# Patient Record
Sex: FEMALE | Race: WHITE | NOT HISPANIC OR LATINO | Employment: FULL TIME | URBAN - METROPOLITAN AREA
[De-identification: names, ages, dates, MRNs, and addresses within clinical notes are randomized per-mention and may not be internally consistent; named-entity substitution may affect disease eponyms.]

---

## 2017-02-07 ENCOUNTER — TRANSCRIBE ORDERS (OUTPATIENT)
Dept: LAB | Facility: HOSPITAL | Age: 42
End: 2017-02-07

## 2017-02-07 ENCOUNTER — LAB (OUTPATIENT)
Dept: LAB | Facility: HOSPITAL | Age: 42
End: 2017-02-07

## 2017-02-07 DIAGNOSIS — Z79.899 POLYPHARMACY: ICD-10-CM

## 2017-02-07 DIAGNOSIS — IMO0001 REASON FOR CONSULTATION: ICD-10-CM

## 2017-02-07 DIAGNOSIS — Z51.81 ENCOUNTER FOR THERAPEUTIC DRUG MONITORING: ICD-10-CM

## 2017-02-07 DIAGNOSIS — L40.50 PSORIATIC ARTHROPATHY (HCC): Primary | ICD-10-CM

## 2017-02-07 DIAGNOSIS — L40.50 PSORIATIC ARTHROPATHY (HCC): ICD-10-CM

## 2017-02-07 LAB
ALBUMIN SERPL-MCNC: 4.2 G/DL (ref 3.2–4.8)
ALBUMIN/GLOB SERPL: 1.6 G/DL (ref 1.5–2.5)
ALP SERPL-CCNC: 111 U/L (ref 25–100)
ALT SERPL W P-5'-P-CCNC: 23 U/L (ref 7–40)
ANION GAP SERPL CALCULATED.3IONS-SCNC: 10 MMOL/L (ref 3–11)
AST SERPL-CCNC: 24 U/L (ref 0–33)
BASOPHILS # BLD AUTO: 0.04 10*3/MM3 (ref 0–0.2)
BASOPHILS NFR BLD AUTO: 0.8 % (ref 0–1)
BILIRUB SERPL-MCNC: 0.5 MG/DL (ref 0.3–1.2)
BUN BLD-MCNC: 6 MG/DL (ref 9–23)
BUN/CREAT SERPL: 8.6 (ref 7–25)
CALCIUM SPEC-SCNC: 9.4 MG/DL (ref 8.7–10.4)
CHLORIDE SERPL-SCNC: 107 MMOL/L (ref 99–109)
CO2 SERPL-SCNC: 23 MMOL/L (ref 20–31)
CREAT BLD-MCNC: 0.7 MG/DL (ref 0.6–1.3)
CRP SERPL-MCNC: 3.4 MG/DL (ref 0–10)
DEPRECATED RDW RBC AUTO: 48.1 FL (ref 37–54)
EOSINOPHIL # BLD AUTO: 0.2 10*3/MM3 (ref 0.1–0.3)
EOSINOPHIL NFR BLD AUTO: 3.8 % (ref 0–3)
ERYTHROCYTE [DISTWIDTH] IN BLOOD BY AUTOMATED COUNT: 14.8 % (ref 11.3–14.5)
GFR SERPL CREATININE-BSD FRML MDRD: 92 ML/MIN/1.73
GLOBULIN UR ELPH-MCNC: 2.7 GM/DL
GLUCOSE BLD-MCNC: 90 MG/DL (ref 70–100)
HCT VFR BLD AUTO: 42.6 % (ref 34.5–44)
HGB BLD-MCNC: 13.9 G/DL (ref 11.5–15.5)
IMM GRANULOCYTES # BLD: 0.01 10*3/MM3 (ref 0–0.03)
IMM GRANULOCYTES NFR BLD: 0.2 % (ref 0–0.6)
LYMPHOCYTES # BLD AUTO: 2.1 10*3/MM3 (ref 0.6–4.8)
LYMPHOCYTES NFR BLD AUTO: 39.9 % (ref 24–44)
MCH RBC QN AUTO: 29 PG (ref 27–31)
MCHC RBC AUTO-ENTMCNC: 32.6 G/DL (ref 32–36)
MCV RBC AUTO: 88.9 FL (ref 80–99)
MONOCYTES # BLD AUTO: 0.48 10*3/MM3 (ref 0–1)
MONOCYTES NFR BLD AUTO: 9.1 % (ref 0–12)
NEUTROPHILS # BLD AUTO: 2.43 10*3/MM3 (ref 1.5–8.3)
NEUTROPHILS NFR BLD AUTO: 46.2 % (ref 41–71)
PLATELET # BLD AUTO: 326 10*3/MM3 (ref 150–450)
PMV BLD AUTO: 11.3 FL (ref 6–12)
POTASSIUM BLD-SCNC: 4.3 MMOL/L (ref 3.5–5.5)
PROT SERPL-MCNC: 6.9 G/DL (ref 5.7–8.2)
RBC # BLD AUTO: 4.79 10*6/MM3 (ref 3.89–5.14)
SODIUM BLD-SCNC: 140 MMOL/L (ref 132–146)
WBC NRBC COR # BLD: 5.26 10*3/MM3 (ref 3.5–10.8)

## 2017-02-07 PROCEDURE — 85025 COMPLETE CBC W/AUTO DIFF WBC: CPT | Performed by: INTERNAL MEDICINE

## 2017-02-07 PROCEDURE — 80053 COMPREHEN METABOLIC PANEL: CPT | Performed by: INTERNAL MEDICINE

## 2017-02-07 PROCEDURE — 36415 COLL VENOUS BLD VENIPUNCTURE: CPT

## 2017-02-07 PROCEDURE — 86140 C-REACTIVE PROTEIN: CPT | Performed by: INTERNAL MEDICINE

## 2020-08-21 ENCOUNTER — TRANSCRIBE ORDERS (OUTPATIENT)
Dept: ADMINISTRATIVE | Facility: HOSPITAL | Age: 45
End: 2020-08-21

## 2020-08-21 DIAGNOSIS — M51.26 HERNIATED LUMBAR INTERVERTEBRAL DISC: ICD-10-CM

## 2020-08-21 DIAGNOSIS — M24.812 INTERNAL DERANGEMENT OF LEFT SHOULDER: Primary | ICD-10-CM

## 2020-09-16 ENCOUNTER — APPOINTMENT (OUTPATIENT)
Dept: MRI IMAGING | Facility: HOSPITAL | Age: 45
End: 2020-09-16

## 2020-09-26 ENCOUNTER — HOSPITAL ENCOUNTER (OUTPATIENT)
Dept: MRI IMAGING | Facility: HOSPITAL | Age: 45
Discharge: HOME | End: 2020-09-26

## 2020-09-26 DIAGNOSIS — M51.26 HERNIATED LUMBAR INTERVERTEBRAL DISC: ICD-10-CM

## 2020-09-26 DIAGNOSIS — M24.812 INTERNAL DERANGEMENT OF LEFT SHOULDER: ICD-10-CM

## 2020-09-26 PROCEDURE — 72148 MRI LUMBAR SPINE W/O DYE: CPT

## 2020-09-26 PROCEDURE — 73221 MRI JOINT UPR EXTREM W/O DYE: CPT

## 2020-11-03 ENCOUNTER — OFFICE VISIT (OUTPATIENT)
Dept: NEUROSURGERY | Facility: CLINIC | Age: 45
End: 2020-11-03

## 2020-11-03 VITALS — HEIGHT: 65 IN | WEIGHT: 265.6 LBS | TEMPERATURE: 96.6 F | BODY MASS INDEX: 44.25 KG/M2

## 2020-11-03 DIAGNOSIS — M54.9 MECHANICAL BACK PAIN: Primary | ICD-10-CM

## 2020-11-03 DIAGNOSIS — M54.16 LUMBAR RADICULOPATHY: ICD-10-CM

## 2020-11-03 DIAGNOSIS — Z98.890 HX OF LUMBAR DISCECTOMY: ICD-10-CM

## 2020-11-03 PROCEDURE — 99243 OFF/OP CNSLTJ NEW/EST LOW 30: CPT | Performed by: NEUROLOGICAL SURGERY

## 2020-11-03 RX ORDER — INFLIXIMAB 100 MG/10ML
INJECTION, POWDER, LYOPHILIZED, FOR SOLUTION INTRAVENOUS ONCE
COMMUNITY
End: 2022-09-19

## 2020-11-03 RX ORDER — OMEPRAZOLE 40 MG/1
CAPSULE, DELAYED RELEASE ORAL
COMMUNITY
Start: 2020-10-28

## 2020-11-03 RX ORDER — ERGOCALCIFEROL 1.25 MG/1
50000 CAPSULE ORAL WEEKLY
COMMUNITY
Start: 2020-10-26 | End: 2021-05-21

## 2020-11-03 RX ORDER — CYCLOBENZAPRINE HCL 5 MG
5-10 TABLET ORAL EVERY 8 HOURS PRN
COMMUNITY
Start: 2020-08-25

## 2020-11-03 RX ORDER — PROPRANOLOL HYDROCHLORIDE 160 MG/1
160 CAPSULE, EXTENDED RELEASE ORAL EVERY MORNING
COMMUNITY
Start: 2020-08-20

## 2020-11-03 RX ORDER — TRAZODONE HYDROCHLORIDE 50 MG/1
TABLET ORAL
COMMUNITY
Start: 2020-08-20

## 2020-11-03 RX ORDER — HYDROCODONE BITARTRATE AND ACETAMINOPHEN 7.5; 325 MG/1; MG/1
TABLET ORAL EVERY 6 HOURS
COMMUNITY
Start: 2020-08-20 | End: 2021-05-21

## 2020-11-03 NOTE — PROGRESS NOTES
Patient: Vanessa Martinez  : 1975    Primary Care Provider: Jose Antonio Queen MD    Requesting Provider: As above        History    Chief Complaint: Low back and left leg pain.    History of Present Illness: Ms. Martinez is a 44-year-old woman who is known to my service.  She is a federal contractor and investigator.  In  I performed a left L4-5 foraminotomy discectomy.  On 2011 I performed a redo procedure with decompression.  This was complicated by a postoperative infection that ultimately required I&D.  She has had some ongoing chronic low back pain that has worsened over the last couple of years.  Occasionally she has pain in her left posterior thigh and around her left ankle.  The lateral toes of the right foot are numb from time to time.  She also some mid back pain that sometimes extends around the flanks.  Years ago she did some physical therapy.  She undergoes treatment for her arthritis as well.  Her symptoms are worse with protracted standing or sitting.  Nothing really makes her feel better.  Her back symptoms are far in excess of her leg symptoms.    Review of Systems   Constitutional: Positive for fatigue. Negative for activity change, appetite change, chills, diaphoresis, fever and unexpected weight change.   HENT: Positive for sinus pressure. Negative for congestion, dental problem, drooling, ear discharge, ear pain, facial swelling, hearing loss, mouth sores, nosebleeds, postnasal drip, rhinorrhea, sinus pain, sneezing, sore throat, tinnitus, trouble swallowing and voice change.    Eyes: Negative for photophobia, pain, discharge, redness, itching and visual disturbance.   Respiratory: Negative for apnea, cough, choking, chest tightness, shortness of breath, wheezing and stridor.    Cardiovascular: Negative for chest pain, palpitations and leg swelling.   Gastrointestinal: Negative for abdominal distention, abdominal pain, anal bleeding, blood in stool, constipation, diarrhea, nausea,  "rectal pain and vomiting.   Endocrine: Negative for cold intolerance, heat intolerance, polydipsia, polyphagia and polyuria.   Genitourinary: Negative for decreased urine volume, difficulty urinating, dyspareunia, dysuria, enuresis, flank pain, frequency, genital sores, hematuria, menstrual problem, pelvic pain, urgency, vaginal bleeding, vaginal discharge and vaginal pain.   Musculoskeletal: Positive for arthralgias, back pain, joint swelling, myalgias, neck pain and neck stiffness. Negative for gait problem.   Skin: Positive for rash. Negative for color change, pallor and wound.   Allergic/Immunologic: Negative for environmental allergies, food allergies and immunocompromised state.   Neurological: Positive for dizziness, light-headedness and headaches. Negative for tremors, seizures, syncope, facial asymmetry, speech difficulty, weakness and numbness.   Hematological: Negative for adenopathy. Does not bruise/bleed easily.   Psychiatric/Behavioral: Negative for agitation, behavioral problems, confusion, decreased concentration, dysphoric mood, hallucinations, self-injury, sleep disturbance and suicidal ideas. The patient is not nervous/anxious and is not hyperactive.        The patient's past medical history, past surgical history, family history, and social history have been reviewed at length in the electronic medical record.    Physical Exam:   Temp 96.6 °F (35.9 °C)   Ht 165.1 cm (65\")   Wt 120 kg (265 lb 9.6 oz)   BMI 44.20 kg/m²   CONSTITUTIONAL: Patient is well-nourished, pleasant and appears stated age.  CV: Heart regular rate and rhythm without murmur, rub, or gallop.  PULMONARY: Lungs are clear to ascultation.  MUSCULOSKELETAL:  Straight leg raising is negative.  Steve's Sign is negative.  ROM in back normal.  Tenderness in the back to palpation is not observed.  Well-healed midline lumbosacral incision is noted.  NEUROLOGICAL:  Orientation, memory, attention span, language function, and cognition " have been examined and are intact.  Strength is intact in the lower extremities to direct testing.  Muscle tone is normal throughout.  Station and gait are normal.  Sensation is intact to light touch testing throughout.  Deep tendon reflexes are 1+ and symmetrical.  Coordination is intact.      Medical Decision Making    Data Review:   MRI of the lumbar spine dated 9/26/2020 demonstrates extensive degenerative disc disease at L4-5 and L5-S1.  Laminotomy defect is noted on the left at L4-5.  The study is without contrast only but I suspect there is some degree of more leftward disc herniation recurrent at the L4-5 level.    Diagnosis:   1.  Chronic mechanical low back pain.  2.  Low-grade lumbar radiculopathy.    Treatment Options:   I have referred the patient to physical therapy.  Her radicular symptoms are not severe enough to warrant surgical intervention.  She is not keen on the idea of injections either.  She has done that in the past.  For the most part Ms. Martienz is getting by.  If her symptoms worsen then she will follow-up with me.       Diagnosis Plan   1. Mechanical back pain     2. Lumbar radiculopathy  Ambulatory Referral to Physical Therapy Evaluate and treat; Stretching, ROM, Strengthening   3. Hx of lumbar discectomy         Scribed for Max Mackenzie MD by Maryuri Ng CMA on 11/3/2020 11:23 EST       I, Dr. Mackenzie, personally performed the services described in the documentation, as scribed in my presence, and it is both accurate and complete.

## 2021-02-08 ENCOUNTER — TRANSCRIBE ORDERS (OUTPATIENT)
Dept: ADMINISTRATIVE | Facility: HOSPITAL | Age: 46
End: 2021-02-08

## 2021-02-08 DIAGNOSIS — I73.9 PERIPHERAL VASCULAR DISEASE, UNSPECIFIED (HCC): Primary | ICD-10-CM

## 2021-02-23 ENCOUNTER — HOSPITAL ENCOUNTER (OUTPATIENT)
Dept: CARDIOLOGY | Facility: HOSPITAL | Age: 46
Discharge: HOME | End: 2021-02-23
Admitting: FAMILY MEDICINE

## 2021-02-23 VITALS — WEIGHT: 265 LBS | BODY MASS INDEX: 44.15 KG/M2 | HEIGHT: 65 IN

## 2021-02-23 DIAGNOSIS — I73.9 PERIPHERAL VASCULAR DISEASE, UNSPECIFIED (HCC): ICD-10-CM

## 2021-02-23 LAB
BH CV GRAFT BRACHIAL PRESSURE LEFT: 120 MMHG
BH CV GRAFT BRACHIAL PRESSURE RIGHT: 107 MMHG
BH CV LEA LEFT ANT TIBIAL A DISTAL PSV: 55.6 CM/S
BH CV LEA LEFT ANT TIBIAL A MID PSV: 64.1 CM/S
BH CV LEA LEFT ANT TIBIAL A PROX PSV: 56.9 CM/S
BH CV LEA LEFT CFA DISTAL PSV: 108 CM/S
BH CV LEA LEFT CFA PROX PSV: 143 CM/S
BH CV LEA LEFT DFA PROX PSV: 72.2 CM/S
BH CV LEA LEFT PERONEAL  MID PSV: 45.3 CM/S
BH CV LEA LEFT POPITEAL A  DISTAL PSV: 96.8 CM/S
BH CV LEA LEFT POPITEAL A  PROX PSV: 79.8 CM/S
BH CV LEA LEFT PTA DISTAL PSV: 139 CM/S
BH CV LEA LEFT PTA MID PSV: 92.7 CM/S
BH CV LEA LEFT PTA PRESSURE: 154 MMHG
BH CV LEA LEFT PTA PROX PSV: 128 CM/S
BH CV LEA LEFT SFA DISTAL PSV: 94.9 CM/S
BH CV LEA LEFT SFA MID PSV: 135 CM/S
BH CV LEA LEFT SFA PROX PSV: 113 CM/S
BH CV LEA RIGHT ANT TIBIAL A DISTAL PSV: 14.7 CM/S
BH CV LEA RIGHT ANT TIBIAL A MID PSV: 11.6 CM/S
BH CV LEA RIGHT ANT TIBIAL A PROX PSV: 29.1 CM/S
BH CV LEA RIGHT CFA DISTAL PSV: 112 CM/S
BH CV LEA RIGHT CFA PROX PSV: 152 CM/S
BH CV LEA RIGHT DFA PROX PSV: 83.6 CM/S
BH CV LEA RIGHT POPITEAL A  DISTAL PSV: 31.4 CM/S
BH CV LEA RIGHT POPITEAL A  PROX PSV: 34.3 CM/S
BH CV LEA RIGHT PTA DISTAL PSV: 11.4 CM/S
BH CV LEA RIGHT PTA MID PSV: 22 CM/S
BH CV LEA RIGHT PTA PROX PSV: 22.8 CM/S
BH CV LEA RIGHT SFA DISTAL PSV: 35.2 CM/S
BH CV LEA RIGHT SFA MID PSV: 84.2 CM/S
BH CV LEA RIGHT SFA PROX PSV: 77.7 CM/S
BH CV LEA RIGHT TIBEOPERONEAL PSV: 64.9 CM/S
BH CV LOWER ARTERIAL LEFT ABI RATIO: 1.28

## 2021-02-23 PROCEDURE — 93925 LOWER EXTREMITY STUDY: CPT

## 2021-02-23 PROCEDURE — 93925 LOWER EXTREMITY STUDY: CPT | Performed by: INTERNAL MEDICINE

## 2021-03-12 ENCOUNTER — TRANSCRIBE ORDERS (OUTPATIENT)
Dept: ADMINISTRATIVE | Facility: HOSPITAL | Age: 46
End: 2021-03-12

## 2021-03-12 ENCOUNTER — HOSPITAL ENCOUNTER (OUTPATIENT)
Dept: GENERAL RADIOLOGY | Facility: HOSPITAL | Age: 46
Discharge: HOME | End: 2021-03-12
Admitting: FAMILY MEDICINE

## 2021-03-12 DIAGNOSIS — L03.115 CELLULITIS OF FOOT, RIGHT: ICD-10-CM

## 2021-03-12 DIAGNOSIS — L03.115 CELLULITIS OF FOOT, RIGHT: Primary | ICD-10-CM

## 2021-03-12 PROCEDURE — 73630 X-RAY EXAM OF FOOT: CPT

## 2021-05-21 ENCOUNTER — CONSULT (OUTPATIENT)
Dept: ONCOLOGY | Facility: CLINIC | Age: 46
End: 2021-05-21

## 2021-05-21 ENCOUNTER — LAB (OUTPATIENT)
Dept: LAB | Facility: HOSPITAL | Age: 46
End: 2021-05-21

## 2021-05-21 VITALS
RESPIRATION RATE: 16 BRPM | HEART RATE: 86 BPM | DIASTOLIC BLOOD PRESSURE: 78 MMHG | HEIGHT: 65 IN | TEMPERATURE: 96.8 F | WEIGHT: 258 LBS | SYSTOLIC BLOOD PRESSURE: 128 MMHG | BODY MASS INDEX: 42.99 KG/M2 | OXYGEN SATURATION: 100 %

## 2021-05-21 DIAGNOSIS — I74.9 ARTERIAL THROMBOSIS (HCC): Primary | ICD-10-CM

## 2021-05-21 LAB
ERYTHROCYTE [DISTWIDTH] IN BLOOD BY AUTOMATED COUNT: 18.1 % (ref 12.3–15.4)
FERRITIN SERPL-MCNC: 15.02 NG/ML (ref 13–150)
HCT VFR BLD AUTO: 30.9 % (ref 34–46.6)
HGB BLD-MCNC: 9.8 G/DL (ref 12–15.9)
IRON 24H UR-MRATE: 26 MCG/DL (ref 37–145)
IRON SATN MFR SERPL: 6 % (ref 20–50)
LYMPHOCYTES # BLD AUTO: 1.9 10*3/MM3 (ref 0.7–3.1)
LYMPHOCYTES NFR BLD AUTO: 44.2 % (ref 19.6–45.3)
MCH RBC QN AUTO: 26.9 PG (ref 26.6–33)
MCHC RBC AUTO-ENTMCNC: 31.8 G/DL (ref 31.5–35.7)
MCV RBC AUTO: 84.6 FL (ref 79–97)
MONOCYTES # BLD AUTO: 0.4 10*3/MM3 (ref 0.1–0.9)
MONOCYTES NFR BLD AUTO: 9.9 % (ref 5–12)
NEUTROPHILS NFR BLD AUTO: 1.9 10*3/MM3 (ref 1.7–7)
NEUTROPHILS NFR BLD AUTO: 45.9 % (ref 42.7–76)
PLATELET # BLD AUTO: 423 10*3/MM3 (ref 140–450)
PMV BLD AUTO: 7 FL (ref 6–12)
RBC # BLD AUTO: 3.66 10*6/MM3 (ref 3.77–5.28)
TIBC SERPL-MCNC: 432 MCG/DL (ref 298–536)
TRANSFERRIN SERPL-MCNC: 290 MG/DL (ref 200–360)
WBC # BLD AUTO: 4.2 10*3/MM3 (ref 3.4–10.8)

## 2021-05-21 PROCEDURE — 85306 CLOT INHIBIT PROT S FREE: CPT | Performed by: INTERNAL MEDICINE

## 2021-05-21 PROCEDURE — 83540 ASSAY OF IRON: CPT | Performed by: INTERNAL MEDICINE

## 2021-05-21 PROCEDURE — 86147 CARDIOLIPIN ANTIBODY EA IG: CPT | Performed by: INTERNAL MEDICINE

## 2021-05-21 PROCEDURE — 99204 OFFICE O/P NEW MOD 45 MIN: CPT | Performed by: INTERNAL MEDICINE

## 2021-05-21 PROCEDURE — 81240 F2 GENE: CPT | Performed by: INTERNAL MEDICINE

## 2021-05-21 PROCEDURE — 86146 BETA-2 GLYCOPROTEIN ANTIBODY: CPT | Performed by: INTERNAL MEDICINE

## 2021-05-21 PROCEDURE — 82728 ASSAY OF FERRITIN: CPT | Performed by: INTERNAL MEDICINE

## 2021-05-21 PROCEDURE — 85305 CLOT INHIBIT PROT S TOTAL: CPT | Performed by: INTERNAL MEDICINE

## 2021-05-21 PROCEDURE — 82607 VITAMIN B-12: CPT | Performed by: INTERNAL MEDICINE

## 2021-05-21 PROCEDURE — 36415 COLL VENOUS BLD VENIPUNCTURE: CPT | Performed by: INTERNAL MEDICINE

## 2021-05-21 PROCEDURE — 82746 ASSAY OF FOLIC ACID SERUM: CPT | Performed by: INTERNAL MEDICINE

## 2021-05-21 PROCEDURE — 85025 COMPLETE CBC W/AUTO DIFF WBC: CPT | Performed by: INTERNAL MEDICINE

## 2021-05-21 PROCEDURE — 84466 ASSAY OF TRANSFERRIN: CPT | Performed by: INTERNAL MEDICINE

## 2021-05-21 PROCEDURE — 85303 CLOT INHIBIT PROT C ACTIVITY: CPT | Performed by: INTERNAL MEDICINE

## 2021-05-21 PROCEDURE — 85302 CLOT INHIBIT PROT C ANTIGEN: CPT | Performed by: INTERNAL MEDICINE

## 2021-05-21 PROCEDURE — 81241 F5 GENE: CPT | Performed by: INTERNAL MEDICINE

## 2021-05-21 RX ORDER — LEVOFLOXACIN 500 MG/1
500 TABLET, FILM COATED ORAL DAILY
COMMUNITY
Start: 2021-02-24 | End: 2021-05-21

## 2021-05-21 RX ORDER — SECUKINUMAB 150 MG/ML
INJECTION SUBCUTANEOUS
COMMUNITY
End: 2021-05-21

## 2021-05-21 RX ORDER — FLUTICASONE PROPIONATE 50 MCG
1 SPRAY, SUSPENSION (ML) NASAL
COMMUNITY
End: 2021-05-21

## 2021-05-21 RX ORDER — CLOPIDOGREL BISULFATE 75 MG/1
75 TABLET ORAL DAILY
COMMUNITY
Start: 2021-05-11 | End: 2021-06-15

## 2021-05-21 RX ORDER — LIDOCAINE 50 MG/G
1 OINTMENT TOPICAL
COMMUNITY
Start: 2021-03-12 | End: 2021-05-21

## 2021-05-21 RX ORDER — AMLODIPINE BESYLATE 5 MG/1
5 TABLET ORAL DAILY
COMMUNITY
Start: 2021-04-18

## 2021-05-21 RX ORDER — APIXABAN 5 MG/1
5 TABLET, FILM COATED ORAL 2 TIMES DAILY
COMMUNITY
Start: 2021-05-11

## 2021-05-21 RX ORDER — HYDROCODONE BITARTRATE AND ACETAMINOPHEN 7.5; 325 MG/1; MG/1
1 TABLET ORAL EVERY 6 HOURS PRN
COMMUNITY

## 2021-05-21 RX ORDER — OXYCODONE HYDROCHLORIDE 5 MG/1
TABLET ORAL
COMMUNITY
Start: 2021-04-13 | End: 2021-05-21

## 2021-05-21 RX ORDER — AMLODIPINE BESYLATE 2.5 MG/1
2.5 TABLET ORAL DAILY
COMMUNITY
Start: 2021-04-18 | End: 2021-05-21

## 2021-05-21 NOTE — PROGRESS NOTES
DATE OF CONSULTATION: 5/21/2021    REFERRING PHYSICIAN: Jose Antonio Queen MD    Dear Jose Antonio Ko MD  Thank you for asking for my medical advice on this patient. I saw her in the  [place] office on 5/21/2021    REASON FOR CONSULTATION: Right below-knee arterial thrombosis    HISTORY OF PRESENT ILLNESS: The patient is a very pleasant 45 y.o.  female   who was in her usual state of health until April 2021.  Patient presented with right lower extremity pain.  Associated with toes discoloration.  Never had this problem before.  This has been getting gradually worse.  She went to her primary care provider and appropriate she was referred to Dr. Roberts from vascular surgery.  Vascular study revealed decreased perfusion right lower extremity with severe narrowing.  CT angiogram revealed thrombus.  She is status post thrombectomy done May 2021.  The patient was started on aspirin Plavix and Eliquis patient was referred to me for further recommendations.    SUBJECTIVE: When I saw the patient today she is here with her .  She is feeling significantly better since the thrombectomy.  She never been diagnosed with blood clot before no venous nor arterial.  She is trying to quit smoking patient does have multiple acquired risk factors for arterial thrombosis including obesity, immobility, chronic inflammation from autoimmune disorders, and tobacco abuse.    Review of Systems   Constitutional: Negative for activity change, appetite change, chills, fatigue, fever and unexpected weight change.   HENT: Negative for hearing loss, mouth sores, nosebleeds, sore throat and trouble swallowing.    Eyes: Negative for visual disturbance.   Respiratory: Negative for cough, chest tightness, shortness of breath and wheezing.    Cardiovascular: Negative for chest pain, palpitations and leg swelling.   Gastrointestinal: Negative for abdominal distention, abdominal pain, blood in stool, constipation, diarrhea, nausea, rectal pain  and vomiting.   Endocrine: Negative for cold intolerance and heat intolerance.   Genitourinary: Negative for difficulty urinating, dysuria, frequency and urgency.   Musculoskeletal: Negative for arthralgias, back pain, gait problem, joint swelling and myalgias.   Skin: Negative for rash.   Neurological: Negative for dizziness, tremors, syncope, weakness, light-headedness, numbness and headaches.   Hematological: Negative for adenopathy. Does not bruise/bleed easily.   Psychiatric/Behavioral: Negative for confusion, sleep disturbance and suicidal ideas. The patient is not nervous/anxious.        Past Medical History:   Diagnosis Date   • Arthritis    • Bronchitis    • Headache    • Hypertension    • Low back pain    • Psoriatic arthritis (CMS/[facility])        Social History     Socioeconomic History   • Marital status:      Spouse name: Not on file   • Number of children: Not on file   • Years of education: Not on file   • Highest education level: Not on file   Tobacco Use   • Smoking status: Current Every Day Smoker     Packs/day: 0.50     Types: Cigarettes   • Smokeless tobacco: Never Used   Substance and Sexual Activity   • Alcohol use: Yes     Comment: occassional   • Drug use: Never   • Sexual activity: Defer       Family History   Problem Relation Age of Onset   • Lupus Mother    • Rheum arthritis Mother    • Arthritis Mother    • Hypertension Mother    • Hyperlipidemia Father    • Hypertension Father    • Fibromyalgia Sister        Past Surgical History:   Procedure Laterality Date   • BILATERAL BREAST REDUCTION      Dr. Rashaad Nguyen   •  SECTION      Dr. Ana Nguyen   • KIDNEY STONE SURGERY      [place], [place]    • LUMBAR DISCECTOMY Left 2004    Left L4 foraminotomy, L4-5 discecomy- Dr. Max Mackenzie   • LUMBAR DISCECTOMY Left 2011    Re-do L4-5 discectomy with neurolysis and foraminotomy- Dr. Max Mackenzie.        Allergies   Allergen Reactions  "  • Morphine Other (See Comments)     Hard to get patient to wake up   • Cefdinir GI Intolerance     gi upset            Current Outpatient Medications:   •  amLODIPine (NORVASC) 5 MG tablet, Take 5 mg by mouth Daily., Disp: , Rfl:   •  ASPIRIN PO, Take  by mouth., Disp: , Rfl:   •  Chantix Starting Month Navid 0.5 MG X 11 & 1 MG X 42 tablet, See Admin Instructions., Disp: , Rfl:   •  clopidogrel (PLAVIX) 75 MG tablet, Take 75 mg by mouth Daily., Disp: , Rfl:   •  cyclobenzaprine (FLEXERIL) 5 MG tablet, Take 5-10 mg by mouth Every 8 (Eight) Hours As Needed., Disp: , Rfl:   •  Eliquis 5 MG tablet tablet, Take 5 mg by mouth 2 (Two) Times a Day., Disp: , Rfl:   •  HYDROcodone-acetaminophen (NORCO) 7.5-325 MG per tablet, Take 1 tablet by mouth Every 6 (Six) Hours As Needed for Moderate Pain ., Disp: , Rfl:   •  inFLIXimab (Remicade) 100 MG injection, Infuse  into a venous catheter 1 (One) Time. Every 8 weeks, Disp: , Rfl:   •  omeprazole (priLOSEC) 40 MG capsule, TAKE 1 CAPSULE BY MOUTH TWICE DAILY . APPOINTMENT REQUIRED FOR FUTURE REFILLS, Disp: , Rfl:   •  propranolol LA (INDERAL LA) 160 MG 24 hr capsule, Take 160 mg by mouth Every Morning., Disp: , Rfl:   •  traZODone (DESYREL) 50 MG tablet, Take  by mouth., Disp: , Rfl:     PHYSICAL EXAMINATION:   /78   Pulse 86   Temp 96.8 °F (36 °C) (Temporal)   Resp 16   Ht 165.1 cm (65\")   Wt 117 kg (258 lb)   SpO2 100%   BMI 42.93 kg/m²   Pain Score    05/21/21 1350   PainSc:   2                   ECOG Performance Status: 1 - Symptomatic but completely ambulatory  General Appearance:  alert, cooperative, no apparent distress and appears stated age   Neurologic/Psychiatric: A&O x 3, gait steady, appropriate affect, strength 5/5 in all muscle groups   HEENT:  Normocephalic, without obvious abnormality, mucous membranes moist   Neck: Supple, symmetrical, trachea midline, no adenopathy;  No thyromegaly, masses, or tenderness   Lungs:   Clear to auscultation bilaterally; " respirations regular, even, and unlabored bilaterally   Heart:  Regular rate and rhythm, no murmurs appreciated   Abdomen:   Soft, non-tender, non-distended and no organomegaly   Lymph nodes: No cervical, supraclavicular, inguinal or axillary adenopathy noted   Extremities: Normal, atraumatic; no clubbing, cyanosis, or edema    Skin: No rashes, ulcers, or suspicious lesions noted       No visits with results within 2 Week(s) from this visit.   Latest known visit with results is:   Hospital Outpatient Visit on 02/23/2021   Component Date Value Ref Range Status   • LEFT ALEXANDRA RATIO 02/23/2021 1.28   Final   • Rt. Tibeoperoneal PSV 02/23/2021 64.90  cm/s Final   • Right Brachial Pressure 02/23/2021 107  mmHg Final   • Left Brachial Pressure 02/23/2021 120  mmHg Final   • CFA Prox PSV-Right 02/23/2021 152.00  cm/s Final   • CFA Distal PSV-Right 02/23/2021 112.00  cm/s Final   • DFA Prox PSV-Right 02/23/2021 83.60  cm/s Final   • SFA Prox PSV-Right 02/23/2021 77.70  cm/s Final   • SFA Mid PSV-Right 02/23/2021 84.20  cm/s Final   • SFA Distal PSV-Right 02/23/2021 35.20  cm/s Final   • Popiteal A Prox PSV-Right 02/23/2021 34.30  cm/s Final   • Popiteal A Distal PSV-Right 02/23/2021 31.40  cm/s Final   • PTA Prox PSV-Right 02/23/2021 22.80  cm/s Final   • PTA Mid PSV-Right 02/23/2021 22.00  cm/s Final   • PTA Distal PSV-Right 02/23/2021 11.40  cm/s Final   • Ant Tibial A Prox PSV-Right 02/23/2021 29.10  cm/s Final   • Ant Tibial A Mid PSV-Right 02/23/2021 11.60  cm/s Final   • Ant Tibial A Distal PSV-Right 02/23/2021 14.70  cm/s Final   • CFA Prox PSV-Left 02/23/2021 143.00  cm/s Final   • CFA Distal PSV-Left 02/23/2021 108.00  cm/s Final   • DFA Prox PSV-Left 02/23/2021 72.20  cm/s Final   • SFA Prox PSV-Left 02/23/2021 113.00  cm/s Final   • SFA Mid PSV-Left 02/23/2021 135.00  cm/s Final   • SFA Distal PSV-Left 02/23/2021 94.90  cm/s Final   • Popiteal A Prox PSV-Left 02/23/2021 79.80  cm/s Final   • Popiteal A Distal  PSV-Left 02/23/2021 96.80  cm/s Final   • PTA Prox PSV-Left 02/23/2021 128.00  cm/s Final   • PTA Mid PSV-Left 02/23/2021 92.70  cm/s Final   • PTA Distal PSV-Left 02/23/2021 139.00  cm/s Final   • Peroneal Mid PSV-Left 02/23/2021 45.30  cm/s Final   • Ant Tibial A Prox PSV-Left 02/23/2021 56.90  cm/s Final   • Ant Tibial A Mid PSV-Left 02/23/2021 64.10  cm/s Final   • Ant Tibial A Distal PSV-Left 02/23/2021 55.60  cm/s Final   • LEFT PTA PRESSURE 02/23/2021 154  mmHg Final        No results found.      DIAGNOSTIC DATA:   1. Radiology:    Interpretation Summary    · RLE obstructive arterial disease with monophasic flow below the knee .  · Normal Left ALEXANDRA.       2. Dr. Queen's notes reviewed by me and documented in the  chart.   3. Pathology report:   Received: 9/19/2011   Reported: 9/21/2011     Clinical Diagnosis and History   The working history is recurrent L4-5 herniation.       Final Diagnosis   Intervertebral disc L4-5:        Fibrocartilage from intervertebral disc.     4. Laboratory data: As above    ASSESSMENT: The patient is a very pleasant 45 y.o.  female  with arterial thrombosis    PROBLEM LIST:   1.  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  Presented with pain and toes discoloration  B.  CT angiogram done May 2021 confirmed right below-knee arterial thrombosis  C. Status post percutaneous thrombectomy done by Dr. ANGEL May 2021  D.  Currently on aspirin Plavix and Eliquis  2.  Obesity  3.  Psoriatic arthritis  4.  Tobacco abuse    PLAN:   1. I had a long discussion today with the patient and her  about her  new diagnosis of arterial thrombosis. I did go over the final pathology report in  detail with both of them. I reviewed the patient's documents including refereing provider's notes, lab results, imaging, and path report.   2.  I explained to the patient that she did have a multiple acquired risk factors that cannot increase the risk of peripheral vascular disease as well  as thrombosis which includes obesity immobility chronic inflammation from her autoimmune disorder as well as tobacco abuse.  At the same time she could have inherited thrombophilia.  3.  I will check the patient blood work today including CBC vitamin levels iron profile prothrombin gene mutation factor V Leiden Antithrombin 3 protein C protein S both level and function, beta 2 glycoprotein 1G body, anticardiolipin antibody.  I will hold off on lupus anticoagulant since it would come back falsely positive while she is on Eliquis.  4.  She will follow-up with me in few weeks to go over the results.  5.  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  6.  The patient advised to exercise and lose weight.  7.  She will continue Remicade for psoriatic arthritis this has been prescribed by rheumatology.  8.  She will continue to follow-up with her primary care.  She has been started on Chantix and it has been helping her tobacco abuse.    Ubaldo Morris MD  5/21/2021

## 2021-05-22 LAB
CARDIOLIPIN IGG SER IA-ACNC: <9 GPL U/ML (ref 0–14)
CARDIOLIPIN IGM SER IA-ACNC: 24 MPL U/ML (ref 0–12)
FOLATE SERPL-MCNC: 4.9 NG/ML (ref 4.78–24.2)
VIT B12 BLD-MCNC: <150 PG/ML (ref 211–946)

## 2021-05-23 LAB
B2 GLYCOPROT1 IGA SER-ACNC: <9 GPI IGA UNITS (ref 0–25)
B2 GLYCOPROT1 IGG SER-ACNC: <9 GPI IGG UNITS (ref 0–20)
B2 GLYCOPROT1 IGM SER-ACNC: 41 GPI IGM UNITS (ref 0–32)
PROT S ACT/NOR PPP: 95 % (ref 63–140)

## 2021-05-24 LAB
F5 GENE MUT ANL BLD/T: NORMAL
FACTOR II, DNA ANALYSIS: NORMAL
PROT C ACT/NOR PPP CHRO: 139 %

## 2021-05-26 LAB
PROT C AG ACT/NOR PPP IA: 123 % (ref 60–150)
PROT S AG ACT/NOR PPP IA: 127 % (ref 60–150)
PROT S FREE AG ACT/NOR PPP IA: 148 % (ref 57–157)

## 2021-06-15 ENCOUNTER — OFFICE VISIT (OUTPATIENT)
Dept: ONCOLOGY | Facility: CLINIC | Age: 46
End: 2021-06-15

## 2021-06-15 ENCOUNTER — LAB (OUTPATIENT)
Dept: LAB | Facility: HOSPITAL | Age: 46
End: 2021-06-15

## 2021-06-15 VITALS
DIASTOLIC BLOOD PRESSURE: 61 MMHG | OXYGEN SATURATION: 99 % | SYSTOLIC BLOOD PRESSURE: 119 MMHG | HEIGHT: 65 IN | RESPIRATION RATE: 16 BRPM | WEIGHT: 260 LBS | HEART RATE: 82 BPM | BODY MASS INDEX: 43.32 KG/M2 | TEMPERATURE: 97.5 F

## 2021-06-15 DIAGNOSIS — K90.9 IRON MALABSORPTION: ICD-10-CM

## 2021-06-15 DIAGNOSIS — I74.9 ARTERIAL THROMBOSIS (HCC): ICD-10-CM

## 2021-06-15 DIAGNOSIS — I74.9 ARTERIAL THROMBOSIS (HCC): Primary | ICD-10-CM

## 2021-06-15 DIAGNOSIS — D50.9 IRON DEFICIENCY ANEMIA, UNSPECIFIED IRON DEFICIENCY ANEMIA TYPE: ICD-10-CM

## 2021-06-15 PROBLEM — E53.8 VITAMIN B 12 DEFICIENCY: Status: ACTIVE | Noted: 2021-06-15

## 2021-06-15 PROCEDURE — 83516 IMMUNOASSAY NONANTIBODY: CPT

## 2021-06-15 PROCEDURE — 36415 COLL VENOUS BLD VENIPUNCTURE: CPT

## 2021-06-15 PROCEDURE — 86340 INTRINSIC FACTOR ANTIBODY: CPT

## 2021-06-15 PROCEDURE — 99214 OFFICE O/P EST MOD 30 MIN: CPT | Performed by: INTERNAL MEDICINE

## 2021-06-15 RX ORDER — CYANOCOBALAMIN 1000 UG/ML
1000 INJECTION, SOLUTION INTRAMUSCULAR; SUBCUTANEOUS ONCE
Status: CANCELLED
Start: 2021-06-22

## 2021-06-15 RX ORDER — SODIUM CHLORIDE 9 MG/ML
250 INJECTION, SOLUTION INTRAVENOUS ONCE
Status: CANCELLED | OUTPATIENT
Start: 2021-06-22

## 2021-06-15 RX ORDER — DIPHENHYDRAMINE HCL 25 MG
25 CAPSULE ORAL ONCE
Status: CANCELLED | OUTPATIENT
Start: 2021-06-22

## 2021-06-15 RX ORDER — FAMOTIDINE 10 MG/ML
20 INJECTION, SOLUTION INTRAVENOUS ONCE
Status: CANCELLED | OUTPATIENT
Start: 2021-06-29

## 2021-06-15 RX ORDER — FAMOTIDINE 10 MG/ML
20 INJECTION, SOLUTION INTRAVENOUS ONCE
Status: CANCELLED | OUTPATIENT
Start: 2021-06-22

## 2021-06-15 RX ORDER — CYANOCOBALAMIN 1000 UG/ML
INJECTION, SOLUTION INTRAMUSCULAR; SUBCUTANEOUS
Qty: 1 ML | Refills: 11 | Status: SHIPPED | OUTPATIENT
Start: 2021-06-15 | End: 2021-09-13 | Stop reason: SDUPTHER

## 2021-06-15 RX ORDER — CYANOCOBALAMIN 1000 UG/ML
1000 INJECTION, SOLUTION INTRAMUSCULAR; SUBCUTANEOUS ONCE
Status: CANCELLED
Start: 2021-06-29

## 2021-06-15 RX ORDER — DIPHENHYDRAMINE HCL 25 MG
25 CAPSULE ORAL ONCE
Status: CANCELLED | OUTPATIENT
Start: 2021-06-29

## 2021-06-15 RX ORDER — SODIUM CHLORIDE 9 MG/ML
250 INJECTION, SOLUTION INTRAVENOUS ONCE
Status: CANCELLED | OUTPATIENT
Start: 2021-06-29

## 2021-06-15 NOTE — PROGRESS NOTES
DATE OF VISIT: 6/15/2021    REASON FOR VISIT: Followup for right below-knee arterial thrombosis     HISTORY OF PRESENT ILLNESS: The patient is a very pleasant 45 y.o. female  with past medical history significant for arterial thrombosis diagnosed May 2021. The  patient is here today for scheduled follow-up visit.    SUBJECTIVE: The patient has been doing fairly well. she was able to tolerate  her treatment without any serious side effects. she denied any fever or  chills, no night sweats, denied any headaches    PAST MEDICAL HISTORY/SOCIAL HISTORY/FAMILY HISTORY: Reviewed by me and unchanged from my documentation done on 06/15/21.    Review of Systems   Constitutional: Negative for activity change, appetite change, chills, fatigue, fever and unexpected weight change.   HENT: Negative for hearing loss, mouth sores, nosebleeds, sore throat and trouble swallowing.    Eyes: Negative for visual disturbance.   Respiratory: Negative for cough, chest tightness, shortness of breath and wheezing.    Cardiovascular: Negative for chest pain, palpitations and leg swelling.   Gastrointestinal: Negative for abdominal distention, abdominal pain, blood in stool, constipation, diarrhea, nausea, rectal pain and vomiting.   Endocrine: Negative for cold intolerance and heat intolerance.   Genitourinary: Negative for difficulty urinating, dysuria, frequency and urgency.   Musculoskeletal: Negative for arthralgias, back pain, gait problem, joint swelling and myalgias.   Skin: Negative for rash.   Neurological: Negative for dizziness, tremors, syncope, weakness, light-headedness, numbness and headaches.   Hematological: Negative for adenopathy. Does not bruise/bleed easily.   Psychiatric/Behavioral: Negative for confusion, sleep disturbance and suicidal ideas. The patient is not nervous/anxious.          Current Outpatient Medications:   •  amLODIPine (NORVASC) 5 MG tablet, Take 5 mg by mouth Daily., Disp: , Rfl:   •  ASPIRIN PO, Take  by  "mouth., Disp: , Rfl:   •  Chantix Starting Month Navid 0.5 MG X 11 & 1 MG X 42 tablet, See Admin Instructions., Disp: , Rfl:   •  cyclobenzaprine (FLEXERIL) 5 MG tablet, Take 5-10 mg by mouth Every 8 (Eight) Hours As Needed., Disp: , Rfl:   •  Eliquis 5 MG tablet tablet, Take 5 mg by mouth 2 (Two) Times a Day., Disp: , Rfl:   •  HYDROcodone-acetaminophen (NORCO) 7.5-325 MG per tablet, Take 1 tablet by mouth Every 6 (Six) Hours As Needed for Moderate Pain ., Disp: , Rfl:   •  inFLIXimab (Remicade) 100 MG injection, Infuse  into a venous catheter 1 (One) Time. Every 8 weeks, Disp: , Rfl:   •  omeprazole (priLOSEC) 40 MG capsule, TAKE 1 CAPSULE BY MOUTH TWICE DAILY . APPOINTMENT REQUIRED FOR FUTURE REFILLS, Disp: , Rfl:   •  propranolol LA (INDERAL LA) 160 MG 24 hr capsule, Take 160 mg by mouth Every Morning., Disp: , Rfl:   •  traZODone (DESYREL) 50 MG tablet, Take  by mouth., Disp: , Rfl:     PHYSICAL EXAMINATION:   /61   Pulse 82   Temp 97.5 °F (36.4 °C) (Temporal)   Resp 16   Ht 165.1 cm (65\")   Wt 118 kg (260 lb)   SpO2 99%   BMI 43.27 kg/m²    Pain Score    06/15/21 1527   PainSc:   5                     ECOG Performance Status: 1 - Symptomatic but completely ambulatory  General Appearance:  alert, cooperative, no apparent distress and appears stated age   Neurologic/Psychiatric: A&O x 3, gait steady, appropriate affect, strength 5/5 in all muscle groups   HEENT:  Normocephalic, without obvious abnormality, mucous membranes moist   Neck: Supple, symmetrical, trachea midline, no adenopathy;  No thyromegaly, masses, or tenderness   Lungs:   Clear to auscultation bilaterally; respirations regular, even, and unlabored bilaterally   Heart:  Regular rate and rhythm, no murmurs appreciated   Abdomen:   Soft, non-tender, non-distended and no organomegaly   Lymph nodes: No cervical, supraclavicular, inguinal or axillary adenopathy noted   Extremities: Normal, atraumatic; no clubbing, cyanosis, or edema    Skin: " No rashes, ulcers, or suspicious lesions noted     No visits with results within 2 Week(s) from this visit.   Latest known visit with results is:   Consult on 05/21/2021   Component Date Value Ref Range Status   • Ferritin 05/21/2021 15.02  13.00 - 150.00 ng/mL Final   • Folate 05/21/2021 4.90  4.78 - 24.20 ng/mL Final   • Vitamin B-12 05/21/2021 <150* 211 - 946 pg/mL Final   • Iron 05/21/2021 26* 37 - 145 mcg/dL Final   • Iron Saturation 05/21/2021 6* 20 - 50 % Final   • Transferrin 05/21/2021 290  200 - 360 mg/dL Final   • TIBC 05/21/2021 432  298 - 536 mcg/dL Final   • Beta-2 Glyco 1 IgG 05/21/2021 <9  0 - 20 GPI IgG units Final    The reference interval reflects a 3SD or 99th percentile interval,  which is thought to represent a potentially clinically significant  result in accordance with the International Consensus Statement on  the classification criteria for definitive antiphospholipid syndrome  (APS). J Thromb Haem 2006;4:295-306.   • Beta-2 Glyco 1 IgA 05/21/2021 <9  0 - 25 GPI IgA units Final    The reference interval reflects a 3SD or 99th percentile interval,  which is thought to represent a potentially clinically significant  result in accordance with the International Consensus Statement on  the classification criteria for definitive antiphospholipid syndrome  (APS). J Thromb Haem 2006;4:295-306.   • Beta-2 Glyco 1 IgM 05/21/2021 41* 0 - 32 GPI IgM units Final    The reference interval reflects a 3SD or 99th percentile interval,  which is thought to represent a potentially clinically significant  result in accordance with the International Consensus Statement on  the classification criteria for definitive antiphospholipid syndrome  (APS). J Thromb Haem 2006;4:295-306.   • Anticardiolipin IgG 05/21/2021 <9  0 - 14 GPL U/mL Final                              Negative:              <15                            Indeterminate:     15 - 20                            Low-Med Positive: >20 - 80                             High Positive:         >80   • Anticardiolipin IgM 05/21/2021 24* 0 - 12 MPL U/mL Final                              Negative:              <13                            Indeterminate:     13 - 20                            Low-Med Positive: >20 - 80                            High Positive:         >80   • Factor V Leiden 05/21/2021 Normal  Normal Final   • Protein C Antigen 05/21/2021 123  60 - 150 % Final   • Protein S Ag, Total 05/21/2021 127  60 - 150 % Final    This test was developed and its performance characteristics  determined by [facility]. It has not been cleared or approved  by the Food and Drug Administration.   • Protein S Ag, Free 05/21/2021 148  57 - 157 % Final    This test was developed and its performance characteristics  determined by [facility]. It has not been cleared or approved  by the Food and Drug Administration.   • Protein S-Functional 05/21/2021 95  63 - 140 % Final    Protein S activity may be falsely increased (masking an abnormal, low  result) in patients receiving direct Xa inhibitor (e.g., rivaroxaban,  apixaban, edoxaban) or a direct thrombin inhibitor (e.g., dabigatran)  anticoagulant treatment due to assay interference by these drugs.   • Prt C Activity (Chromogenic) 05/21/2021 139  % Final    Reference Range:  17 years and older: 73 - 180   • Factor II, DNA Analysis 05/21/2021 Normal  Normal Final   • WBC 05/21/2021 4.20  3.40 - 10.80 10*3/mm3 Final   • RBC 05/21/2021 3.66* 3.77 - 5.28 10*6/mm3 Final   • Hemoglobin 05/21/2021 9.8* 12.0 - 15.9 g/dL Final   • Hematocrit 05/21/2021 30.9* 34.0 - 46.6 % Final   • RDW 05/21/2021 18.1* 12.3 - 15.4 % Final   • MCV 05/21/2021 84.6  79.0 - 97.0 fL Final   • MCH 05/21/2021 26.9  26.6 - 33.0 pg Final   • MCHC 05/21/2021 31.8  31.5 - 35.7 g/dL Final   • MPV 05/21/2021 7.0  6.0 - 12.0 fL Final   • Platelets 05/21/2021 423  140 - 450 10*3/mm3 Final   • Neutrophil % 05/21/2021 45.9  42.7 - 76.0 % Final   • Lymphocyte % 05/21/2021  44.2  19.6 - 45.3 % Final   • Monocyte % 05/21/2021 9.9  5.0 - 12.0 % Final   • Neutrophils, Absolute 05/21/2021 1.90  1.70 - 7.00 10*3/mm3 Final   • Lymphocytes, Absolute 05/21/2021 1.90  0.70 - 3.10 10*3/mm3 Final   • Monocytes, Absolute 05/21/2021 0.40  0.10 - 0.90 10*3/mm3 Final        No results found.    ASSESSMENT: The patient is a very pleasant 45 y.o. female  with arterial thrombosis    PROBLEM LIST:   1.  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  Presented with pain and toes discoloration  B.  CT angiogram done May 2021 confirmed right below-knee arterial thrombosis  C. Status post percutaneous thrombectomy done by Dr. Sawyer May 2021  D.  Positive IgM beta-2 glycoprotein 1 and IgM anticardiolipin antibodies  E.  Currently on aspirin Plavix and Eliquis  2.  Obesity  3.  Psoriatic arthritis  4.  Tobacco abuse  5.  Severe iron deficiency  6.  Severe vitamin B12 deficiency    PLAN:  1.  I did go over the results with the patient.  I explained to her that her IgM antibodies were positive for both anticardiolipin and beta-2 glycoprotein A.  2.  I will continue Eliquis at the same dose 5 mg twice daily for now.  3.  I will repeat her IgM antibodies in 3 months if they remain positive she is to stay on lifelong anticoagulation at that point.  4.  I will treat the patient with 2 dose of IV iron using Injectafer 750 mg 1 week apart.  5.  I will treat the patient with iron B12 1000 mcg IM weekly x4 then monthly after that.  6.  I will check her for pernicious anemia given her history of autoimmune disorders with concurrent B12 and iron deficiency.  I will check antiparietal cell antibody and intrinsic factor antibodies.    Ubaldo Morris MD  6/15/2021

## 2021-06-16 LAB — PCA AB SER-ACNC: 43.5 UNITS (ref 0–20)

## 2021-06-18 LAB — IF BLOCK AB SER-ACNC: 1.1 AU/ML (ref 0–1.1)

## 2021-06-22 ENCOUNTER — HOSPITAL ENCOUNTER (OUTPATIENT)
Dept: ONCOLOGY | Facility: HOSPITAL | Age: 46
Setting detail: INFUSION SERIES
Discharge: HOME | End: 2021-06-22

## 2021-06-22 VITALS
HEART RATE: 84 BPM | RESPIRATION RATE: 18 BRPM | WEIGHT: 255 LBS | TEMPERATURE: 97.9 F | BODY MASS INDEX: 42.49 KG/M2 | HEIGHT: 65 IN | SYSTOLIC BLOOD PRESSURE: 134 MMHG | DIASTOLIC BLOOD PRESSURE: 81 MMHG

## 2021-06-22 DIAGNOSIS — D50.9 IRON DEFICIENCY ANEMIA, UNSPECIFIED IRON DEFICIENCY ANEMIA TYPE: Primary | ICD-10-CM

## 2021-06-22 DIAGNOSIS — K90.9 IRON MALABSORPTION: ICD-10-CM

## 2021-06-22 PROCEDURE — 96375 TX/PRO/DX INJ NEW DRUG ADDON: CPT

## 2021-06-22 PROCEDURE — 63710000001 DIPHENHYDRAMINE PER 50 MG: Performed by: INTERNAL MEDICINE

## 2021-06-22 PROCEDURE — 96374 THER/PROPH/DIAG INJ IV PUSH: CPT

## 2021-06-22 PROCEDURE — 25010000002 CYANOCOBALAMIN PER 1000 MCG: Performed by: INTERNAL MEDICINE

## 2021-06-22 PROCEDURE — 96372 THER/PROPH/DIAG INJ SC/IM: CPT

## 2021-06-22 PROCEDURE — 25010000002 FERRIC CARBOXYMALTOSE 750 MG/15ML SOLUTION 15 ML VIAL: Performed by: INTERNAL MEDICINE

## 2021-06-22 RX ORDER — CYANOCOBALAMIN 1000 UG/ML
1000 INJECTION, SOLUTION INTRAMUSCULAR; SUBCUTANEOUS ONCE
Status: COMPLETED | OUTPATIENT
Start: 2021-06-22 | End: 2021-06-22

## 2021-06-22 RX ORDER — DIPHENHYDRAMINE HCL 25 MG
25 CAPSULE ORAL ONCE
Status: COMPLETED | OUTPATIENT
Start: 2021-06-22 | End: 2021-06-22

## 2021-06-22 RX ORDER — FAMOTIDINE 10 MG/ML
20 INJECTION, SOLUTION INTRAVENOUS ONCE
Status: COMPLETED | OUTPATIENT
Start: 2021-06-22 | End: 2021-06-22

## 2021-06-22 RX ORDER — SODIUM CHLORIDE 9 MG/ML
250 INJECTION, SOLUTION INTRAVENOUS ONCE
Status: COMPLETED | OUTPATIENT
Start: 2021-06-22 | End: 2021-06-22

## 2021-06-22 RX ADMIN — DIPHENHYDRAMINE HYDROCHLORIDE 25 MG: 25 CAPSULE ORAL at 15:29

## 2021-06-22 RX ADMIN — SODIUM CHLORIDE 250 ML: 9 INJECTION, SOLUTION INTRAVENOUS at 15:24

## 2021-06-22 RX ADMIN — FAMOTIDINE 20 MG: 10 INJECTION, SOLUTION INTRAVENOUS at 15:26

## 2021-06-22 RX ADMIN — FERRIC CARBOXYMALTOSE INJECTION 750 MG: 50 INJECTION, SOLUTION INTRAVENOUS at 15:58

## 2021-06-22 RX ADMIN — CYANOCOBALAMIN 1000 MCG: 1000 INJECTION, SOLUTION INTRAMUSCULAR; SUBCUTANEOUS at 16:00

## 2021-06-29 ENCOUNTER — HOSPITAL ENCOUNTER (OUTPATIENT)
Dept: ONCOLOGY | Facility: HOSPITAL | Age: 46
Setting detail: INFUSION SERIES
Discharge: HOME | End: 2021-06-29

## 2021-06-29 VITALS
HEIGHT: 65 IN | TEMPERATURE: 97 F | WEIGHT: 257 LBS | HEART RATE: 77 BPM | SYSTOLIC BLOOD PRESSURE: 134 MMHG | BODY MASS INDEX: 42.82 KG/M2 | RESPIRATION RATE: 16 BRPM | DIASTOLIC BLOOD PRESSURE: 51 MMHG

## 2021-06-29 DIAGNOSIS — K90.9 IRON MALABSORPTION: ICD-10-CM

## 2021-06-29 DIAGNOSIS — D50.9 IRON DEFICIENCY ANEMIA, UNSPECIFIED IRON DEFICIENCY ANEMIA TYPE: Primary | ICD-10-CM

## 2021-06-29 PROCEDURE — 63710000001 DIPHENHYDRAMINE PER 50 MG: Performed by: INTERNAL MEDICINE

## 2021-06-29 PROCEDURE — 25010000002 CYANOCOBALAMIN PER 1000 MCG: Performed by: INTERNAL MEDICINE

## 2021-06-29 PROCEDURE — 96374 THER/PROPH/DIAG INJ IV PUSH: CPT

## 2021-06-29 PROCEDURE — 96372 THER/PROPH/DIAG INJ SC/IM: CPT

## 2021-06-29 PROCEDURE — 96365 THER/PROPH/DIAG IV INF INIT: CPT

## 2021-06-29 PROCEDURE — 96375 TX/PRO/DX INJ NEW DRUG ADDON: CPT

## 2021-06-29 PROCEDURE — 25010000002 FERRIC CARBOXYMALTOSE 750 MG/15ML SOLUTION 15 ML VIAL: Performed by: INTERNAL MEDICINE

## 2021-06-29 RX ORDER — FAMOTIDINE 10 MG/ML
20 INJECTION, SOLUTION INTRAVENOUS ONCE
Status: COMPLETED | OUTPATIENT
Start: 2021-06-29 | End: 2021-06-29

## 2021-06-29 RX ORDER — CYANOCOBALAMIN 1000 UG/ML
1000 INJECTION, SOLUTION INTRAMUSCULAR; SUBCUTANEOUS ONCE
Status: COMPLETED | OUTPATIENT
Start: 2021-06-29 | End: 2021-06-29

## 2021-06-29 RX ORDER — SODIUM CHLORIDE 9 MG/ML
250 INJECTION, SOLUTION INTRAVENOUS ONCE
Status: COMPLETED | OUTPATIENT
Start: 2021-06-29 | End: 2021-06-29

## 2021-06-29 RX ORDER — DIPHENHYDRAMINE HCL 25 MG
25 CAPSULE ORAL ONCE
Status: COMPLETED | OUTPATIENT
Start: 2021-06-29 | End: 2021-06-29

## 2021-06-29 RX ADMIN — DIPHENHYDRAMINE HYDROCHLORIDE 25 MG: 25 CAPSULE ORAL at 15:04

## 2021-06-29 RX ADMIN — FAMOTIDINE 20 MG: 10 INJECTION, SOLUTION INTRAVENOUS at 15:06

## 2021-06-29 RX ADMIN — SODIUM CHLORIDE 250 ML: 9 INJECTION, SOLUTION INTRAVENOUS at 15:24

## 2021-06-29 RX ADMIN — FERRIC CARBOXYMALTOSE INJECTION 750 MG: 50 INJECTION, SOLUTION INTRAVENOUS at 15:24

## 2021-06-29 RX ADMIN — CYANOCOBALAMIN 1000 MCG: 1000 INJECTION, SOLUTION INTRAMUSCULAR; SUBCUTANEOUS at 16:03

## 2021-07-30 ENCOUNTER — TRANSCRIBE ORDERS (OUTPATIENT)
Dept: ADMINISTRATIVE | Facility: HOSPITAL | Age: 46
End: 2021-07-30

## 2021-07-30 DIAGNOSIS — I73.9 PERIPHERAL VASCULAR DISEASE, UNSPECIFIED (HCC): Primary | ICD-10-CM

## 2021-08-31 ENCOUNTER — TELEPHONE (OUTPATIENT)
Dept: ONCOLOGY | Facility: CLINIC | Age: 46
End: 2021-08-31

## 2021-08-31 NOTE — TELEPHONE ENCOUNTER
Caller: RAJESH    Relationship: OFFICE. [facility]    Best call back number: [phone]    What form or medical record are you requesting: LAST OFFICE NOTE    Who is requesting this form or medical record from you: [facility]    How would you like to receive the form or medical records (pick-up, mail, fax): FAX  If fax, what is the fax number: [phone]      Timeframe paperwork needed: PT CURRENTLY IN OFFICE    Additional notes:

## 2021-09-08 ENCOUNTER — HOSPITAL ENCOUNTER (OUTPATIENT)
Dept: CARDIOLOGY | Facility: HOSPITAL | Age: 46
Discharge: HOME | End: 2021-09-08
Admitting: FAMILY MEDICINE

## 2021-09-08 VITALS — WEIGHT: 257 LBS | HEIGHT: 65 IN | BODY MASS INDEX: 42.82 KG/M2

## 2021-09-08 DIAGNOSIS — I73.9 PERIPHERAL VASCULAR DISEASE, UNSPECIFIED (HCC): ICD-10-CM

## 2021-09-08 LAB
BH CV LOWER ARTERIAL LEFT ABI RATIO: 1.13
BH CV LOWER ARTERIAL LEFT DORSALIS PEDIS SYS MAX: 115 MMHG
BH CV LOWER ARTERIAL LEFT GREAT TOE SYS MAX: 88 MMHG
BH CV LOWER ARTERIAL LEFT POST TIBIAL SYS MAX: 132 MMHG
BH CV LOWER ARTERIAL LEFT TBI RATIO: 0.75
BH CV LOWER ARTERIAL RIGHT ABI RATIO: 1.03
BH CV LOWER ARTERIAL RIGHT DORSALIS PEDIS SYS MAX: 121 MMHG
BH CV LOWER ARTERIAL RIGHT GREAT TOE SYS MAX: 78 MMHG
BH CV LOWER ARTERIAL RIGHT POST TIBIAL SYS MAX: 115 MMHG
BH CV LOWER ARTERIAL RIGHT TBI RATIO: 0.67
MAXIMAL PREDICTED HEART RATE: 175 BPM
STRESS TARGET HR: 149 BPM
UPPER ARTERIAL LEFT ARM BRACHIAL SYS MAX: 117 MMHG
UPPER ARTERIAL RIGHT ARM BRACHIAL SYS MAX: 117 MMHG

## 2021-09-08 PROCEDURE — 93923 UPR/LXTR ART STDY 3+ LVLS: CPT

## 2021-09-08 PROCEDURE — 93923 UPR/LXTR ART STDY 3+ LVLS: CPT | Performed by: INTERNAL MEDICINE

## 2021-09-09 ENCOUNTER — TRANSCRIBE ORDERS (OUTPATIENT)
Dept: ADMINISTRATIVE | Facility: HOSPITAL | Age: 46
End: 2021-09-09

## 2021-09-10 ENCOUNTER — TRANSCRIBE ORDERS (OUTPATIENT)
Dept: ADMINISTRATIVE | Facility: HOSPITAL | Age: 46
End: 2021-09-10

## 2021-09-10 DIAGNOSIS — I73.9 PERIPHERAL VASCULAR DISEASE (HCC): Primary | ICD-10-CM

## 2021-09-13 ENCOUNTER — OFFICE VISIT (OUTPATIENT)
Dept: ONCOLOGY | Facility: CLINIC | Age: 46
End: 2021-09-13

## 2021-09-13 ENCOUNTER — LAB (OUTPATIENT)
Dept: LAB | Facility: HOSPITAL | Age: 46
End: 2021-09-13

## 2021-09-13 VITALS
OXYGEN SATURATION: 96 % | HEIGHT: 65 IN | RESPIRATION RATE: 16 BRPM | TEMPERATURE: 97.7 F | BODY MASS INDEX: 44.32 KG/M2 | HEART RATE: 114 BPM | WEIGHT: 266 LBS | SYSTOLIC BLOOD PRESSURE: 141 MMHG | DIASTOLIC BLOOD PRESSURE: 76 MMHG

## 2021-09-13 DIAGNOSIS — I74.9 ARTERIAL THROMBOSIS (HCC): ICD-10-CM

## 2021-09-13 DIAGNOSIS — D50.9 IRON DEFICIENCY ANEMIA, UNSPECIFIED IRON DEFICIENCY ANEMIA TYPE: Primary | ICD-10-CM

## 2021-09-13 DIAGNOSIS — D50.9 IRON DEFICIENCY ANEMIA, UNSPECIFIED IRON DEFICIENCY ANEMIA TYPE: ICD-10-CM

## 2021-09-13 DIAGNOSIS — K90.9 IRON MALABSORPTION: ICD-10-CM

## 2021-09-13 DIAGNOSIS — D50.0 IRON DEFICIENCY ANEMIA DUE TO CHRONIC BLOOD LOSS: Primary | ICD-10-CM

## 2021-09-13 LAB
ERYTHROCYTE [DISTWIDTH] IN BLOOD BY AUTOMATED COUNT: 21.3 % (ref 12.3–15.4)
FERRITIN SERPL-MCNC: 193.1 NG/ML (ref 13–150)
HCT VFR BLD AUTO: 38.5 % (ref 34–46.6)
HGB BLD-MCNC: 13 G/DL (ref 12–15.9)
IRON 24H UR-MRATE: 63 MCG/DL (ref 37–145)
IRON SATN MFR SERPL: 20 % (ref 20–50)
LYMPHOCYTES # BLD AUTO: 1.5 10*3/MM3 (ref 0.7–3.1)
LYMPHOCYTES NFR BLD AUTO: 33.5 % (ref 19.6–45.3)
MCH RBC QN AUTO: 31.2 PG (ref 26.6–33)
MCHC RBC AUTO-ENTMCNC: 33.9 G/DL (ref 31.5–35.7)
MCV RBC AUTO: 92 FL (ref 79–97)
MONOCYTES # BLD AUTO: 0.3 10*3/MM3 (ref 0.1–0.9)
MONOCYTES NFR BLD AUTO: 6.6 % (ref 5–12)
NEUTROPHILS NFR BLD AUTO: 2.6 10*3/MM3 (ref 1.7–7)
NEUTROPHILS NFR BLD AUTO: 59.9 % (ref 42.7–76)
PLATELET # BLD AUTO: 274 10*3/MM3 (ref 140–450)
PMV BLD AUTO: 7.5 FL (ref 6–12)
RBC # BLD AUTO: 4.18 10*6/MM3 (ref 3.77–5.28)
TIBC SERPL-MCNC: 310 MCG/DL (ref 298–536)
TRANSFERRIN SERPL-MCNC: 208 MG/DL (ref 200–360)
VIT B12 BLD-MCNC: 415 PG/ML (ref 211–946)
WBC # BLD AUTO: 4.4 10*3/MM3 (ref 3.4–10.8)

## 2021-09-13 PROCEDURE — 86147 CARDIOLIPIN ANTIBODY EA IG: CPT

## 2021-09-13 PROCEDURE — 83540 ASSAY OF IRON: CPT

## 2021-09-13 PROCEDURE — 36415 COLL VENOUS BLD VENIPUNCTURE: CPT

## 2021-09-13 PROCEDURE — 85025 COMPLETE CBC W/AUTO DIFF WBC: CPT

## 2021-09-13 PROCEDURE — 99214 OFFICE O/P EST MOD 30 MIN: CPT | Performed by: INTERNAL MEDICINE

## 2021-09-13 PROCEDURE — 82607 VITAMIN B-12: CPT

## 2021-09-13 PROCEDURE — 84466 ASSAY OF TRANSFERRIN: CPT

## 2021-09-13 PROCEDURE — 82728 ASSAY OF FERRITIN: CPT

## 2021-09-13 PROCEDURE — 86146 BETA-2 GLYCOPROTEIN ANTIBODY: CPT

## 2021-09-13 RX ORDER — CYANOCOBALAMIN 1000 UG/ML
INJECTION, SOLUTION INTRAMUSCULAR; SUBCUTANEOUS
Qty: 1 ML | Refills: 11 | Status: SHIPPED | OUTPATIENT
Start: 2021-09-13 | End: 2022-11-28

## 2021-09-13 RX ORDER — ACETAMINOPHEN AND CODEINE PHOSPHATE 300; 30 MG/1; MG/1
TABLET ORAL
COMMUNITY
Start: 2021-06-28 | End: 2022-09-19

## 2021-09-13 RX ORDER — LEFLUNOMIDE 20 MG/1
20 TABLET ORAL DAILY
COMMUNITY
Start: 2021-08-31

## 2021-09-13 NOTE — PROGRESS NOTES
DATE OF VISIT: 9/13/2021    REASON FOR VISIT: Followup for right below-knee arterial thrombosis     HISTORY OF PRESENT ILLNESS: The patient is a very pleasant 45 y.o. female  with past medical history significant for arterial thrombosis diagnosed May 2021. The  patient is here today for scheduled follow-up visit.    SUBJECTIVE: The patient is here today by herself.  She is doing well no active bleeding.  Her energy has improved.  She is still doing B12 shots once a month.    PAST MEDICAL HISTORY/SOCIAL HISTORY/FAMILY HISTORY: Reviewed by me and unchanged from my documentation done on 09/13/21.    Review of Systems   Constitutional: Negative for activity change, appetite change, chills, fatigue, fever and unexpected weight change.   HENT: Negative for hearing loss, mouth sores, nosebleeds, sore throat and trouble swallowing.    Eyes: Negative for visual disturbance.   Respiratory: Negative for cough, chest tightness, shortness of breath and wheezing.    Cardiovascular: Negative for chest pain, palpitations and leg swelling.   Gastrointestinal: Negative for abdominal distention, abdominal pain, blood in stool, constipation, diarrhea, nausea, rectal pain and vomiting.   Endocrine: Negative for cold intolerance and heat intolerance.   Genitourinary: Negative for difficulty urinating, dysuria, frequency and urgency.   Musculoskeletal: Negative for arthralgias, back pain, gait problem, joint swelling and myalgias.   Skin: Negative for rash.   Neurological: Negative for dizziness, tremors, syncope, weakness, light-headedness, numbness and headaches.   Hematological: Negative for adenopathy. Does not bruise/bleed easily.   Psychiatric/Behavioral: Negative for confusion, sleep disturbance and suicidal ideas. The patient is not nervous/anxious.          Current Outpatient Medications:   •  acetaminophen-codeine (TYLENOL #3) 300-30 MG per tablet, , Disp: , Rfl:   •  amLODIPine (NORVASC) 5 MG tablet, Take 5 mg by mouth Daily.,  "Disp: , Rfl:   •  ASPIRIN PO, Take  by mouth., Disp: , Rfl:   •  Chantix Starting Month Navid 0.5 MG X 11 & 1 MG X 42 tablet, See Admin Instructions., Disp: , Rfl:   •  cyanocobalamin 1000 MCG/ML injection, Inject 1ml subq weekly for 2 weeks, then monthly thereafter starting week of JUly 6, 2021., Disp: 1 mL, Rfl: 11  •  cyclobenzaprine (FLEXERIL) 5 MG tablet, Take 5-10 mg by mouth Every 8 (Eight) Hours As Needed., Disp: , Rfl:   •  Eliquis 5 MG tablet tablet, Take 5 mg by mouth 2 (Two) Times a Day., Disp: , Rfl:   •  HYDROcodone-acetaminophen (NORCO) 7.5-325 MG per tablet, Take 1 tablet by mouth Every 6 (Six) Hours As Needed for Moderate Pain ., Disp: , Rfl:   •  inFLIXimab (Remicade) 100 MG injection, Infuse  into a venous catheter 1 (One) Time. Every 8 weeks, Disp: , Rfl:   •  leflunomide (ARAVA) 20 MG tablet, Take 20 mg by mouth Daily., Disp: , Rfl:   •  omeprazole (priLOSEC) 40 MG capsule, TAKE 1 CAPSULE BY MOUTH TWICE DAILY . APPOINTMENT REQUIRED FOR FUTURE REFILLS, Disp: , Rfl:   •  propranolol LA (INDERAL LA) 160 MG 24 hr capsule, Take 160 mg by mouth Every Morning., Disp: , Rfl:   •  traZODone (DESYREL) 50 MG tablet, Take  by mouth., Disp: , Rfl:     PHYSICAL EXAMINATION:   /76   Pulse 114   Temp 97.7 °F (36.5 °C) (Temporal)   Resp 16   Ht 165.1 cm (65\")   Wt 121 kg (266 lb)   SpO2 96%   BMI 44.26 kg/m²    Pain Score    09/13/21 1547   PainSc: 0-No pain                     ECOG Performance Status: 1 - Symptomatic but completely ambulatory  General Appearance:  alert, cooperative, no apparent distress and appears stated age   Neurologic/Psychiatric: A&O x 3, gait steady, appropriate affect, strength 5/5 in all muscle groups   HEENT:  Normocephalic, without obvious abnormality, mucous membranes moist   Neck: Supple, symmetrical, trachea midline, no adenopathy;  No thyromegaly, masses, or tenderness   Lungs:   Clear to auscultation bilaterally; respirations regular, even, and unlabored bilaterally "   Heart:  Regular rate and rhythm, no murmurs appreciated   Abdomen:   Soft, non-tender, non-distended and no organomegaly   Lymph nodes: No cervical, supraclavicular, inguinal or axillary adenopathy noted   Extremities: Normal, atraumatic; no clubbing, cyanosis, or edema    Skin: No rashes, ulcers, or suspicious lesions noted     Lab on 09/13/2021   Component Date Value Ref Range Status   • WBC 09/13/2021 4.40  3.40 - 10.80 10*3/mm3 Final   • RBC 09/13/2021 4.18  3.77 - 5.28 10*6/mm3 Final   • Hemoglobin 09/13/2021 13.0  12.0 - 15.9 g/dL Final   • Hematocrit 09/13/2021 38.5  34.0 - 46.6 % Final   • RDW 09/13/2021 21.3* 12.3 - 15.4 % Final   • MCV 09/13/2021 92.0  79.0 - 97.0 fL Final   • MCH 09/13/2021 31.2  26.6 - 33.0 pg Final   • MCHC 09/13/2021 33.9  31.5 - 35.7 g/dL Final   • MPV 09/13/2021 7.5  6.0 - 12.0 fL Final   • Platelets 09/13/2021 274  140 - 450 10*3/mm3 Final   • Neutrophil % 09/13/2021 59.9  42.7 - 76.0 % Final   • Lymphocyte % 09/13/2021 33.5  19.6 - 45.3 % Final   • Monocyte % 09/13/2021 6.6  5.0 - 12.0 % Final   • Neutrophils, Absolute 09/13/2021 2.60  1.70 - 7.00 10*3/mm3 Final   • Lymphocytes, Absolute 09/13/2021 1.50  0.70 - 3.10 10*3/mm3 Final   • Monocytes, Absolute 09/13/2021 0.30  0.10 - 0.90 10*3/mm3 Final   Hospital Outpatient Visit on 09/08/2021   Component Date Value Ref Range Status   • Target HR (85%) 09/08/2021 149  bpm Final   • Max. Pred. HR (100%) 09/08/2021 175  bpm Final   • RIGHT DORSALIS PEDIS SYS MAX 09/08/2021 121  mmHg Final   • RIGHT POST TIBIAL SYS MAX 09/08/2021 115  mmHg Final   • RIGHT GREAT TOE SYS MAX 09/08/2021 78  mmHg Final   • RIGHT ALEXANDRA RATIO 09/08/2021 1.03   Final   • RIGHT TBI RATIO 09/08/2021 0.67   Final   • LEFT DORSALIS PEDIS SYS MAX 09/08/2021 115  mmHg Final   • LEFT POST TIBIAL SYS MAX 09/08/2021 132  mmHg Final   • LEFT GREAT TOE SYS MAX 09/08/2021 88  mmHg Final   • LEFT ALEXANDRA RATIO 09/08/2021 1.13   Final   • LEFT TBI RATIO 09/08/2021 0.75   Final   •  Upper arterial right arm brachial * 09/08/2021 117  mmHg Final   • Upper arterial left arm brachial s* 09/08/2021 117  mmHg Final        Doppler arterial multi level lower extremity bilateral CAR    Result Date: 9/8/2021  Narrative: · Right Conclusion: The right ALEXANDRA is normal. · Left Conclusion: The left ALEXANDRA is normal.        ASSESSMENT: The patient is a very pleasant 45 y.o. female  with arterial thrombosis    PROBLEM LIST:   1.  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  Presented with pain and toes discoloration  B.  CT angiogram done May 2021 confirmed right below-knee arterial thrombosis  C. Status post percutaneous thrombectomy done by Dr. Sawyer May 2021  D.  Status post stent placement.  E.  Positive IgM beta-2 glycoprotein 1 and IgM anticardiolipin antibodies  E.  Currently on Eliquis 5 mg twice daily  2.  Obesity  3.  Psoriatic arthritis  4.  Tobacco abuse  5.  Severe iron deficiency  6.  Severe vitamin B12 deficiency  7.  Pernicious anemia:  8.  Positive antiparietal cell antibody Gayle 15, 2021    PLAN:  1.  I we will follow up on blood work results from today including her anticardiolipin antibodies and beta-2 glycoprotein A antibodies.  2.  I will continue Eliquis at the same dose 5 mg twice daily for now.  I will consider lowering the dose to 2.5 mg twice daily on return if her antiphospholipid antibodies came back negative.  3.  The patient will follow up with me in 6 months with repeated labs.  4.  The patient will be treated on as-needed basis with 2 dose of IV iron using Injectafer 750 mg 1 week apart if her ferritin is less than 50 or saturation is less than 20%.  5.  Patient will need to stay on lifelong B12 1000 mcg IM monthly.      Ubaldo Morris MD  9/13/2021

## 2021-09-14 LAB
CARDIOLIPIN IGG SER IA-ACNC: <9 GPL U/ML (ref 0–14)
CARDIOLIPIN IGM SER IA-ACNC: 25 MPL U/ML (ref 0–12)

## 2021-09-15 LAB
B2 GLYCOPROT1 IGA SER-ACNC: <9 GPI IGA UNITS (ref 0–25)
B2 GLYCOPROT1 IGG SER-ACNC: <9 GPI IGG UNITS (ref 0–20)
B2 GLYCOPROT1 IGM SER-ACNC: 72 GPI IGM UNITS (ref 0–32)

## 2021-09-15 NOTE — PROGRESS NOTES
Looks like he saw her yesterday and said if her antibodies came back negative he might lower her dose, but since they are still positive, moreso in some cases, confirm with him that she needs to stay on 5 mg BID and let her know.

## 2021-09-16 ENCOUNTER — TELEPHONE (OUTPATIENT)
Dept: ONCOLOGY | Facility: CLINIC | Age: 46
End: 2021-09-16

## 2021-09-16 NOTE — TELEPHONE ENCOUNTER
Called to discuss with patient.  Advised her that Dr. Gil advised she needs to stay on eliquis 5mg bid as antibodies still positive.  She verbalized understanding.

## 2021-09-16 NOTE — TELEPHONE ENCOUNTER
----- Message from KHADRA Anthony sent at 9/15/2021  9:24 AM EDT -----  Looks like he saw her yesterday and said if her antibodies came back negative he might lower her dose, but since they are still positive, moreso in some cases, confirm with him that she needs to stay on 5 mg BID and let her know.

## 2022-03-14 ENCOUNTER — TELEPHONE (OUTPATIENT)
Dept: ONCOLOGY | Facility: CLINIC | Age: 47
End: 2022-03-14

## 2022-03-14 NOTE — TELEPHONE ENCOUNTER
Caller: Vanessa Martinez    Relationship to patient: Self    Best call back number: [phone]    Chief complaint: PATIENT NEEDS TO RESCHEDULE, HUB UNABLE TO, DUE TO PATIENT BEING IN ACTIVE TREATMENT    Type of visit: FOLLOW UP    Requested date: CAN NOT DO MARCH 21, 22, OR 23RD. AND WOULD LIKE AFTERNOON    If rescheduling, when is the original appointment: 3-16-22

## 2022-03-18 ENCOUNTER — OFFICE VISIT (OUTPATIENT)
Dept: ONCOLOGY | Facility: CLINIC | Age: 47
End: 2022-03-18

## 2022-03-18 ENCOUNTER — TELEPHONE (OUTPATIENT)
Dept: ONCOLOGY | Facility: CLINIC | Age: 47
End: 2022-03-18

## 2022-03-18 ENCOUNTER — LAB (OUTPATIENT)
Dept: LAB | Facility: HOSPITAL | Age: 47
End: 2022-03-18

## 2022-03-18 VITALS
WEIGHT: 273 LBS | TEMPERATURE: 96.6 F | SYSTOLIC BLOOD PRESSURE: 138 MMHG | DIASTOLIC BLOOD PRESSURE: 91 MMHG | HEART RATE: 92 BPM | HEIGHT: 65 IN | OXYGEN SATURATION: 97 % | RESPIRATION RATE: 16 BRPM | BODY MASS INDEX: 45.48 KG/M2

## 2022-03-18 DIAGNOSIS — D50.0 IRON DEFICIENCY ANEMIA DUE TO CHRONIC BLOOD LOSS: Primary | ICD-10-CM

## 2022-03-18 DIAGNOSIS — I74.9 ARTERIAL THROMBOSIS: ICD-10-CM

## 2022-03-18 DIAGNOSIS — D50.0 IRON DEFICIENCY ANEMIA SECONDARY TO BLOOD LOSS (CHRONIC): Primary | ICD-10-CM

## 2022-03-18 DIAGNOSIS — R10.9 ABDOMINAL PAIN, UNSPECIFIED ABDOMINAL LOCATION: ICD-10-CM

## 2022-03-18 DIAGNOSIS — K90.9 IRON MALABSORPTION: ICD-10-CM

## 2022-03-18 DIAGNOSIS — D50.0 IRON DEFICIENCY ANEMIA DUE TO CHRONIC BLOOD LOSS: ICD-10-CM

## 2022-03-18 LAB
ERYTHROCYTE [DISTWIDTH] IN BLOOD BY AUTOMATED COUNT: 14.5 % (ref 12.3–15.4)
FERRITIN SERPL-MCNC: 163.4 NG/ML (ref 13–150)
HCT VFR BLD AUTO: 41 % (ref 34–46.6)
HGB BLD-MCNC: 13.9 G/DL (ref 12–15.9)
IRON 24H UR-MRATE: 77 MCG/DL (ref 37–145)
IRON SATN MFR SERPL: 21 % (ref 20–50)
LYMPHOCYTES # BLD AUTO: 1.7 10*3/MM3 (ref 0.7–3.1)
LYMPHOCYTES NFR BLD AUTO: 45.9 % (ref 19.6–45.3)
MCH RBC QN AUTO: 30.5 PG (ref 26.6–33)
MCHC RBC AUTO-ENTMCNC: 33.8 G/DL (ref 31.5–35.7)
MCV RBC AUTO: 90.4 FL (ref 79–97)
MONOCYTES # BLD AUTO: 0.2 10*3/MM3 (ref 0.1–0.9)
MONOCYTES NFR BLD AUTO: 6.6 % (ref 5–12)
NEUTROPHILS NFR BLD AUTO: 1.8 10*3/MM3 (ref 1.7–7)
NEUTROPHILS NFR BLD AUTO: 47.5 % (ref 42.7–76)
PLATELET # BLD AUTO: 269 10*3/MM3 (ref 140–450)
PMV BLD AUTO: 7.7 FL (ref 6–12)
RBC # BLD AUTO: 4.54 10*6/MM3 (ref 3.77–5.28)
TIBC SERPL-MCNC: 359 MCG/DL (ref 298–536)
TRANSFERRIN SERPL-MCNC: 241 MG/DL (ref 200–360)
VIT B12 BLD-MCNC: 391 PG/ML (ref 211–946)
WBC NRBC COR # BLD: 3.7 10*3/MM3 (ref 3.4–10.8)

## 2022-03-18 PROCEDURE — 83540 ASSAY OF IRON: CPT

## 2022-03-18 PROCEDURE — 82607 VITAMIN B-12: CPT

## 2022-03-18 PROCEDURE — 84466 ASSAY OF TRANSFERRIN: CPT

## 2022-03-18 PROCEDURE — 99214 OFFICE O/P EST MOD 30 MIN: CPT | Performed by: NURSE PRACTITIONER

## 2022-03-18 PROCEDURE — 36415 COLL VENOUS BLD VENIPUNCTURE: CPT

## 2022-03-18 PROCEDURE — 82728 ASSAY OF FERRITIN: CPT

## 2022-03-18 PROCEDURE — 85025 COMPLETE CBC W/AUTO DIFF WBC: CPT

## 2022-03-18 RX ORDER — GABAPENTIN 100 MG/1
CAPSULE ORAL
COMMUNITY
Start: 2022-02-27

## 2022-03-18 RX ORDER — SYRINGE WITH NEEDLE, 1 ML 25GX5/8"
SYRINGE, EMPTY DISPOSABLE MISCELLANEOUS
COMMUNITY
Start: 2022-02-05

## 2022-03-18 NOTE — TELEPHONE ENCOUNTER
----- Message from KHADRA Anthony sent at 3/18/2022  3:02 PM EDT -----  Will you let her know she does not need IV iron? Keep doing B12 shots monthly. If her dizziness persists, make sure she follows up with PCP or if acute change, then go to ER. Thanks!!!!   ----- Message -----  From: Lillie Kruse APRN  Sent: 3/18/2022   1:20 PM EDT  To: KHADRA Anthony    F/U on labs

## 2022-03-18 NOTE — PROGRESS NOTES
"DATE OF VISIT: 3/18/2022    REASON FOR VISIT: Followup for right below-knee arterial thrombosis    PROBLEM LIST:   1.  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  Presented with pain and toes discoloration  B.  CT angiogram done May 2021 confirmed right below-knee arterial thrombosis  C. Status post percutaneous thrombectomy done by Dr. Sawyer May 2021  D.  Status post stent placement.  E.  Positive IgM beta-2 glycoprotein 1 and IgM anticardiolipin antibodies  E.  Currently on Eliquis 5 mg twice daily  2.  Obesity  3.  Psoriatic arthritis  4.  Tobacco abuse  5.  Severe iron deficiency  6.  Severe vitamin B12 deficiency  7.  Pernicious anemia:  8.  Positive antiparietal cell antibody Gayle 15, 2021     HISTORY OF PRESENT ILLNESS: The patient is a very pleasant 46 y.o. female  with past medical history significant for arterial thrombosis diagnosed May 2021. The  patient is here today for scheduled follow-up visit.    SUBJECTIVE: The patient is here today with her . She has been doing fair since her last visit. She is complaining of increased fatigue and occasional dizziness at times. She denies evidence of bleeding. She has been compliant with vitamin B12 injections monthly as well as with her Eliquis twice a day. She complains of \"stomach issues\" over the last several weeks with loose stools that are lighter in color. She also complains of upset stomach and breakthrough acid reflux despite use of Prilosec. She has never had colonoscopy or EGD. She denies vomiting. She thinks her psoriatic arthritis may be flared up due to some increased joint discomfort. She is still on her Remicade and followed by Dr. Yancey with rheumatology.     Past History:  Medical History: has a past medical history of Arthritis, Bronchitis, Headache, Hypertension, Low back pain, and Psoriatic arthritis (HCC).   Surgical History: has a past surgical history that includes Lumbar discectomy (Left, 08/09/2004); Lumbar " "discectomy (Left, 2011); Breast Reduction ();  section (); and Kidney stone surgery ().   Family History: family history includes Arthritis in her mother; Fibromyalgia in her sister; Hyperlipidemia in her father; Hypertension in her father and mother; Lupus in her mother; Rheum arthritis in her mother.   Social History: reports that she has been smoking cigarettes. She has been smoking about 0.50 packs per day. She has never used smokeless tobacco. She reports current alcohol use. She reports that she does not use drugs.    (Not in a hospital admission)     Allergies: Morphine and Cefdinir      Review of Systems   Constitutional: Positive for fatigue.   Gastrointestinal: Positive for abdominal distention, abdominal pain and diarrhea.        Acid reflux   Musculoskeletal: Positive for arthralgias.   Psychiatric/Behavioral: Positive for sleep disturbance. The patient is nervous/anxious.    All other systems reviewed and are negative.        Current Outpatient Medications:   •  acetaminophen-codeine (TYLENOL #3) 300-30 MG per tablet, , Disp: , Rfl:   •  amLODIPine (NORVASC) 5 MG tablet, Take 5 mg by mouth Daily., Disp: , Rfl:   •  ASPIRIN PO, Take  by mouth., Disp: , Rfl:   •  B-D 3CC LUER-FRED SYR 25GX1\" 25G X 1\" 3 ML misc, USE 1 SYRINGE ONCE EVERY MONTH, Disp: , Rfl:   •  cyanocobalamin 1000 MCG/ML injection, Inject 1ml subq monthly, Disp: 1 mL, Rfl: 11  •  cyclobenzaprine (FLEXERIL) 5 MG tablet, Take 5-10 mg by mouth Every 8 (Eight) Hours As Needed., Disp: , Rfl:   •  Eliquis 5 MG tablet tablet, Take 5 mg by mouth 2 (Two) Times a Day., Disp: , Rfl:   •  gabapentin (NEURONTIN) 100 MG capsule, TAKE 1 CAPSULE BY MOUTH ONCE DAILY AT BEDTIME FOR 7 DAYS, 2 CAPS AT BEDTIME FOR 7 DAYS, THEN 3 CAPS AT BEDTIME FOR 7 DAYS, Disp: , Rfl:   •  HYDROcodone-acetaminophen (NORCO) 7.5-325 MG per tablet, Take 1 tablet by mouth Every 6 (Six) Hours As Needed for Moderate Pain ., Disp: , Rfl:   •  inFLIXimab " "(Remicade) 100 MG injection, Infuse  into a venous catheter 1 (One) Time. Every 8 weeks, Disp: , Rfl:   •  leflunomide (ARAVA) 20 MG tablet, Take 20 mg by mouth Daily., Disp: , Rfl:   •  omeprazole (priLOSEC) 40 MG capsule, TAKE 1 CAPSULE BY MOUTH TWICE DAILY . APPOINTMENT REQUIRED FOR FUTURE REFILLS, Disp: , Rfl:   •  propranolol LA (INDERAL LA) 160 MG 24 hr capsule, Take 160 mg by mouth Every Morning., Disp: , Rfl:   •  traZODone (DESYREL) 50 MG tablet, Take  by mouth., Disp: , Rfl:     PHYSICAL EXAMINATION:   /91 Comment: left wrist  Pulse 92   Temp 96.6 °F (35.9 °C) (Temporal)   Resp 16   Ht 165.1 cm (65\")   Wt 124 kg (273 lb)   SpO2 97%   BMI 45.43 kg/m²    Pain Score    03/18/22 0927   PainSc: 0-No pain      ECOG score: 0        ECOG Performance Status: 1 - Symptomatic but completely ambulatory  General Appearance:  alert, cooperative, no apparent distress, appears stated age and obese   Neurologic/Psychiatric: A&O x 3, gait steady, appropriate affect, strength 5/5 in all muscle groups   HEENT:  Normocephalic, without obvious abnormality, mucous membranes moist   Neck: Supple, symmetrical, trachea midline, no adenopathy;  No thyromegaly, masses, or tenderness   Lungs:   Clear to auscultation bilaterally; respirations regular, even, and unlabored bilaterally   Heart:  Regular rate and rhythm, no murmurs appreciated   Abdomen:   Soft, non-tender, non-distended and no organomegaly   Lymph nodes: No cervical, supraclavicular, inguinal or axillary adenopathy noted   Extremities: Normal, atraumatic; no clubbing, cyanosis, or edema    Skin: No rashes, ulcers, or suspicious lesions noted     Lab on 03/18/2022   Component Date Value Ref Range Status   • Iron 03/18/2022 77  37 - 145 mcg/dL Final    Specimen hemolyzed.  Results may be affected.   • Iron Saturation 03/18/2022 21  20 - 50 % Final   • Transferrin 03/18/2022 241  200 - 360 mg/dL Final   • TIBC 03/18/2022 359  298 - 536 mcg/dL Final   • Ferritin " 03/18/2022 163.40 (A) 13.00 - 150.00 ng/mL Final   • WBC 03/18/2022 3.70  3.40 - 10.80 10*3/mm3 Final   • RBC 03/18/2022 4.54  3.77 - 5.28 10*6/mm3 Final   • Hemoglobin 03/18/2022 13.9  12.0 - 15.9 g/dL Final   • Hematocrit 03/18/2022 41.0  34.0 - 46.6 % Final   • RDW 03/18/2022 14.5  12.3 - 15.4 % Final   • MCV 03/18/2022 90.4  79.0 - 97.0 fL Final   • MCH 03/18/2022 30.5  26.6 - 33.0 pg Final   • MCHC 03/18/2022 33.8  31.5 - 35.7 g/dL Final   • MPV 03/18/2022 7.7  6.0 - 12.0 fL Final   • Platelets 03/18/2022 269  140 - 450 10*3/mm3 Final   • Neutrophil % 03/18/2022 47.5  42.7 - 76.0 % Final   • Lymphocyte % 03/18/2022 45.9 (A) 19.6 - 45.3 % Final   • Monocyte % 03/18/2022 6.6  5.0 - 12.0 % Final   • Neutrophils, Absolute 03/18/2022 1.80  1.70 - 7.00 10*3/mm3 Final   • Lymphocytes, Absolute 03/18/2022 1.70  0.70 - 3.10 10*3/mm3 Final   • Monocytes, Absolute 03/18/2022 0.20  0.10 - 0.90 10*3/mm3 Final        No results found.    ASSESSMENT: The patient is a very pleasant 46 y.o. female  with arterial thrombosis    PLAN:  1.  I reviewed the lab results from today with the patient. I reassured her that her hemoglobin is normal at 13.9 with WBC of 3.7 and platelet count of 269,000. We will follow up on the ferritin, iron profile, and vitamin B12 results and notify her of findings.   2.  I will continue Eliquis at the same dose 5 mg twice daily indefinitely. Her repeat anticardiolipin antibodies came back positive, therefore we will not change her to maintenance dosing.   3. We will arrange for repeat IV iron replacement if needed for ferritin less than 50 or iron saturation less than 20%.   4. She will continue monthly vitamin B12 replacement.   5. Regarding her abdominal complaints, we will refer to Gi for evaluation. She is 46 so needs colonoscopy for colon cancer screening anyway, and will likely benefit from EGD to evaluation for chronic reflux. We will add on CMP to her labs today due to complaints of change in  stool color. If her scopes are normal, we discussed consider gallbladder work up through her primary care provider. She will continue omeprazole 20 mg daily.   6. She will continue Remicade for psoriatic arthritis. She will continue follow up with Dr. Yancey with rheumatology.      FOLLOW UP: 6 months with labs    Lillie Kruse, APRN  3/18/2022

## 2022-09-12 ENCOUNTER — TELEPHONE (OUTPATIENT)
Dept: ONCOLOGY | Facility: CLINIC | Age: 47
End: 2022-09-12

## 2022-09-12 NOTE — TELEPHONE ENCOUNTER
Caller: Vanessa Martinez    Relationship to patient: Self    Best call back number: [phone]    Chief complaint: NEEDS TO R/S 9/16     Type of visit: F/U 1    Requested date: ANY DAY BUT 9/28, MORNING OR EVENING.    If rescheduling, when is the original appointment: 9/16

## 2022-09-19 ENCOUNTER — OFFICE VISIT (OUTPATIENT)
Dept: ONCOLOGY | Facility: CLINIC | Age: 47
End: 2022-09-19

## 2022-09-19 ENCOUNTER — LAB (OUTPATIENT)
Dept: LAB | Facility: HOSPITAL | Age: 47
End: 2022-09-19

## 2022-09-19 VITALS
HEIGHT: 65 IN | BODY MASS INDEX: 41.32 KG/M2 | WEIGHT: 248 LBS | OXYGEN SATURATION: 90 % | HEART RATE: 84 BPM | RESPIRATION RATE: 16 BRPM | TEMPERATURE: 96.6 F | DIASTOLIC BLOOD PRESSURE: 61 MMHG | SYSTOLIC BLOOD PRESSURE: 129 MMHG

## 2022-09-19 DIAGNOSIS — D50.0 IRON DEFICIENCY ANEMIA DUE TO CHRONIC BLOOD LOSS: ICD-10-CM

## 2022-09-19 DIAGNOSIS — I74.9 ARTERIAL THROMBOSIS: ICD-10-CM

## 2022-09-19 DIAGNOSIS — K90.9 IRON MALABSORPTION: ICD-10-CM

## 2022-09-19 DIAGNOSIS — D50.0 IRON DEFICIENCY ANEMIA SECONDARY TO BLOOD LOSS (CHRONIC): ICD-10-CM

## 2022-09-19 DIAGNOSIS — D50.0 IRON DEFICIENCY ANEMIA DUE TO CHRONIC BLOOD LOSS: Primary | ICD-10-CM

## 2022-09-19 LAB
ALBUMIN SERPL-MCNC: 4.3 G/DL (ref 3.5–5.2)
ALBUMIN/GLOB SERPL: 1.5 G/DL
ALP SERPL-CCNC: 79 U/L (ref 39–117)
ALT SERPL W P-5'-P-CCNC: 21 U/L (ref 1–33)
ANION GAP SERPL CALCULATED.3IONS-SCNC: 10 MMOL/L (ref 5–15)
AST SERPL-CCNC: 19 U/L (ref 1–32)
BASOPHILS # BLD AUTO: 0.03 10*3/MM3 (ref 0–0.2)
BASOPHILS NFR BLD AUTO: 0.7 % (ref 0–1.5)
BILIRUB SERPL-MCNC: 0.3 MG/DL (ref 0–1.2)
BUN SERPL-MCNC: 13 MG/DL (ref 6–20)
BUN/CREAT SERPL: 12.5 (ref 7–25)
CALCIUM SPEC-SCNC: 9.5 MG/DL (ref 8.6–10.5)
CHLORIDE SERPL-SCNC: 101 MMOL/L (ref 98–107)
CO2 SERPL-SCNC: 28 MMOL/L (ref 22–29)
CREAT SERPL-MCNC: 1.04 MG/DL (ref 0.57–1)
DEPRECATED RDW RBC AUTO: 47.3 FL (ref 37–54)
EGFRCR SERPLBLD CKD-EPI 2021: 67.3 ML/MIN/1.73
EOSINOPHIL # BLD AUTO: 0.14 10*3/MM3 (ref 0–0.4)
EOSINOPHIL NFR BLD AUTO: 3.4 % (ref 0.3–6.2)
ERYTHROCYTE [DISTWIDTH] IN BLOOD BY AUTOMATED COUNT: 14 % (ref 12.3–15.4)
FERRITIN SERPL-MCNC: 136.9 NG/ML (ref 13–150)
GLOBULIN UR ELPH-MCNC: 2.8 GM/DL
GLUCOSE SERPL-MCNC: 87 MG/DL (ref 65–99)
HCT VFR BLD AUTO: 43.3 % (ref 34–46.6)
HGB BLD-MCNC: 13.8 G/DL (ref 12–15.9)
IMM GRANULOCYTES # BLD AUTO: 0 10*3/MM3 (ref 0–0.05)
IMM GRANULOCYTES NFR BLD AUTO: 0 % (ref 0–0.5)
IRON 24H UR-MRATE: 67 MCG/DL (ref 37–145)
IRON SATN MFR SERPL: 16 % (ref 20–50)
LYMPHOCYTES # BLD AUTO: 1.61 10*3/MM3 (ref 0.7–3.1)
LYMPHOCYTES NFR BLD AUTO: 39.4 % (ref 19.6–45.3)
MCH RBC QN AUTO: 28.7 PG (ref 26.6–33)
MCHC RBC AUTO-ENTMCNC: 31.9 G/DL (ref 31.5–35.7)
MCV RBC AUTO: 90 FL (ref 79–97)
MONOCYTES # BLD AUTO: 0.29 10*3/MM3 (ref 0.1–0.9)
MONOCYTES NFR BLD AUTO: 7.1 % (ref 5–12)
NEUTROPHILS NFR BLD AUTO: 2.02 10*3/MM3 (ref 1.7–7)
NEUTROPHILS NFR BLD AUTO: 49.4 % (ref 42.7–76)
PLATELET # BLD AUTO: 293 10*3/MM3 (ref 140–450)
PMV BLD AUTO: 10.1 FL (ref 6–12)
POTASSIUM SERPL-SCNC: 4.2 MMOL/L (ref 3.5–5.2)
PROT SERPL-MCNC: 7.1 G/DL (ref 6–8.5)
RBC # BLD AUTO: 4.81 10*6/MM3 (ref 3.77–5.28)
SODIUM SERPL-SCNC: 139 MMOL/L (ref 136–145)
TIBC SERPL-MCNC: 428 MCG/DL (ref 298–536)
TRANSFERRIN SERPL-MCNC: 287 MG/DL (ref 200–360)
VIT B12 BLD-MCNC: 465 PG/ML (ref 211–946)
WBC NRBC COR # BLD: 4.09 10*3/MM3 (ref 3.4–10.8)

## 2022-09-19 PROCEDURE — 99214 OFFICE O/P EST MOD 30 MIN: CPT | Performed by: INTERNAL MEDICINE

## 2022-09-19 PROCEDURE — 83540 ASSAY OF IRON: CPT

## 2022-09-19 PROCEDURE — 84466 ASSAY OF TRANSFERRIN: CPT

## 2022-09-19 PROCEDURE — 82607 VITAMIN B-12: CPT

## 2022-09-19 PROCEDURE — 85025 COMPLETE CBC W/AUTO DIFF WBC: CPT

## 2022-09-19 PROCEDURE — 36415 COLL VENOUS BLD VENIPUNCTURE: CPT

## 2022-09-19 PROCEDURE — 82728 ASSAY OF FERRITIN: CPT

## 2022-09-19 PROCEDURE — 80053 COMPREHEN METABOLIC PANEL: CPT

## 2022-09-19 RX ORDER — IXEKIZUMAB 80 MG/ML
INJECTION, SOLUTION SUBCUTANEOUS
COMMUNITY
Start: 2022-08-18 | End: 2023-03-20 | Stop reason: ALTCHOICE

## 2022-09-19 RX ORDER — ROSUVASTATIN CALCIUM 20 MG/1
20 TABLET, COATED ORAL DAILY
COMMUNITY
Start: 2022-06-24

## 2022-09-19 RX ORDER — FENOFIBRATE 145 MG/1
TABLET, COATED ORAL
COMMUNITY
Start: 2022-09-17

## 2022-09-19 RX ORDER — FLUOXETINE 20 MG/1
TABLET, FILM COATED ORAL
COMMUNITY
Start: 2022-06-22

## 2022-09-19 NOTE — PROGRESS NOTES
DATE OF VISIT: 2022    REASON FOR VISIT: Followup for right below-knee arterial thrombosis    PROBLEM LIST:   1.  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  Presented with pain and toes discoloration  B.  CT angiogram done May 2021 confirmed right below-knee arterial thrombosis  C. Status post percutaneous thrombectomy done by Dr. Sawyer May 2021  D.  Status post stent placement.  E.  Positive IgM beta-2 glycoprotein 1 and IgM anticardiolipin antibodies  E.  Currently on Eliquis 5 mg twice daily, not interested in warfarin.  2.  Obesity  3.  Psoriatic arthritis  4.  Tobacco abuse  5.  Severe iron deficiency  6.  Severe vitamin B12 deficiency  7.  Pernicious anemia:  8.  Positive antiparietal cell antibody Gayle 15, 2021     HISTORY OF PRESENT ILLNESS: The patient is a very pleasant 46 y.o. female  with past medical history significant for arterial thrombosis diagnosed May 2021. The  patient is here today for scheduled follow-up visit.    SUBJECTIVE: The patient is here today by herself.  She is doing fairly well.  Denies any fever chills night sweats.  Denies any active bleeding.    Past History:  Medical History: has a past medical history of Arthritis, Bronchitis, Headache, Hypertension, Low back pain, and Psoriatic arthritis (HCC).   Surgical History: has a past surgical history that includes Lumbar discectomy (Left, 2004); Lumbar discectomy (Left, 2011); Breast Reduction ();  section (); and Kidney stone surgery ().   Family History: family history includes Arthritis in her mother; Fibromyalgia in her sister; Hyperlipidemia in her father; Hypertension in her father and mother; Lupus in her mother; Rheum arthritis in her mother.   Social History: reports that she has been smoking cigarettes. She has been smoking about 0.50 packs per day. She has never used smokeless tobacco. She reports current alcohol use. She reports that she does not use drugs.    (Not in  "a hospital admission)     Allergies: Morphine and Cefdinir      Review of Systems   Constitutional: Positive for fatigue.   Gastrointestinal: Positive for abdominal distention, abdominal pain and diarrhea.        Acid reflux   Musculoskeletal: Positive for arthralgias.   Psychiatric/Behavioral: Positive for sleep disturbance. The patient is nervous/anxious.          Current Outpatient Medications:   •  amLODIPine (NORVASC) 5 MG tablet, Take 5 mg by mouth Daily., Disp: , Rfl:   •  ASPIRIN PO, Take  by mouth., Disp: , Rfl:   •  B-D 3CC LUER-FRED SYR 25GX1\" 25G X 1\" 3 ML misc, USE 1 SYRINGE ONCE EVERY MONTH, Disp: , Rfl:   •  cyanocobalamin 1000 MCG/ML injection, Inject 1ml subq monthly, Disp: 1 mL, Rfl: 11  •  cyclobenzaprine (FLEXERIL) 5 MG tablet, Take 5-10 mg by mouth Every 8 (Eight) Hours As Needed., Disp: , Rfl:   •  Eliquis 5 MG tablet tablet, Take 5 mg by mouth 2 (Two) Times a Day., Disp: , Rfl:   •  fenofibrate (TRICOR) 145 MG tablet, , Disp: , Rfl:   •  FLUoxetine (PROzac) 20 MG tablet, , Disp: , Rfl:   •  gabapentin (NEURONTIN) 100 MG capsule, TAKE 1 CAPSULE BY MOUTH ONCE DAILY AT BEDTIME FOR 7 DAYS, 2 CAPS AT BEDTIME FOR 7 DAYS, THEN 3 CAPS AT BEDTIME FOR 7 DAYS, Disp: , Rfl:   •  HYDROcodone-acetaminophen (NORCO) 7.5-325 MG per tablet, Take 1 tablet by mouth Every 6 (Six) Hours As Needed for Moderate Pain ., Disp: , Rfl:   •  leflunomide (ARAVA) 20 MG tablet, Take 20 mg by mouth Daily., Disp: , Rfl:   •  omeprazole (priLOSEC) 40 MG capsule, TAKE 1 CAPSULE BY MOUTH TWICE DAILY . APPOINTMENT REQUIRED FOR FUTURE REFILLS, Disp: , Rfl:   •  propranolol LA (INDERAL LA) 160 MG 24 hr capsule, Take 160 mg by mouth Every Morning., Disp: , Rfl:   •  rosuvastatin (CRESTOR) 20 MG tablet, Take 20 mg by mouth Daily., Disp: , Rfl:   •  Taltz 80 MG/ML solution auto-injector, , Disp: , Rfl:   •  traZODone (DESYREL) 50 MG tablet, Take  by mouth., Disp: , Rfl:     PHYSICAL EXAMINATION:   /61   Pulse 84   Temp 96.6 °F " "(35.9 °C) (Temporal)   Resp 16   Ht 165.1 cm (65\")   Wt 112 kg (248 lb)   SpO2 90%   BMI 41.27 kg/m²    Pain Score    09/19/22 0907   PainSc: 0-No pain      ECOG score: 0        ECOG Performance Status: 1 - Symptomatic but completely ambulatory  General Appearance:  alert, cooperative, no apparent distress, appears stated age and obese   Neurologic/Psychiatric: A&O x 3, gait steady, appropriate affect, strength 5/5 in all muscle groups   HEENT:  Normocephalic, without obvious abnormality, mucous membranes moist   Neck: Supple, symmetrical, trachea midline, no adenopathy;  No thyromegaly, masses, or tenderness   Lungs:   Clear to auscultation bilaterally; respirations regular, even, and unlabored bilaterally   Heart:  Regular rate and rhythm, no murmurs appreciated   Abdomen:   Soft, non-tender, non-distended and no organomegaly   Lymph nodes: No cervical, supraclavicular, inguinal or axillary adenopathy noted   Extremities: Normal, atraumatic; no clubbing, cyanosis, or edema    Skin: No rashes, ulcers, or suspicious lesions noted     Lab on 09/19/2022   Component Date Value Ref Range Status   • WBC 09/19/2022 4.09  3.40 - 10.80 10*3/mm3 Final   • RBC 09/19/2022 4.81  3.77 - 5.28 10*6/mm3 Final   • Hemoglobin 09/19/2022 13.8  12.0 - 15.9 g/dL Final   • Hematocrit 09/19/2022 43.3  34.0 - 46.6 % Final   • MCV 09/19/2022 90.0  79.0 - 97.0 fL Final   • MCH 09/19/2022 28.7  26.6 - 33.0 pg Final   • MCHC 09/19/2022 31.9  31.5 - 35.7 g/dL Final   • RDW 09/19/2022 14.0  12.3 - 15.4 % Final   • RDW-SD 09/19/2022 47.3  37.0 - 54.0 fl Final   • MPV 09/19/2022 10.1  6.0 - 12.0 fL Final   • Platelets 09/19/2022 293  140 - 450 10*3/mm3 Final   • Neutrophil % 09/19/2022 49.4  42.7 - 76.0 % Final   • Lymphocyte % 09/19/2022 39.4  19.6 - 45.3 % Final   • Monocyte % 09/19/2022 7.1  5.0 - 12.0 % Final   • Eosinophil % 09/19/2022 3.4  0.3 - 6.2 % Final   • Basophil % 09/19/2022 0.7  0.0 - 1.5 % Final   • Immature Grans % 09/19/2022 " 0.0  0.0 - 0.5 % Final   • Neutrophils, Absolute 09/19/2022 2.02  1.70 - 7.00 10*3/mm3 Final   • Lymphocytes, Absolute 09/19/2022 1.61  0.70 - 3.10 10*3/mm3 Final   • Monocytes, Absolute 09/19/2022 0.29  0.10 - 0.90 10*3/mm3 Final   • Eosinophils, Absolute 09/19/2022 0.14  0.00 - 0.40 10*3/mm3 Final   • Basophils, Absolute 09/19/2022 0.03  0.00 - 0.20 10*3/mm3 Final   • Immature Grans, Absolute 09/19/2022 0.00  0.00 - 0.05 10*3/mm3 Final        No results found.    ASSESSMENT: The patient is a very pleasant 46 y.o. female  with arterial thrombosis    PLAN:  1.  Normocytic anemia:  A.  I did go over the CBC result from March 18, 2022 and reassured the patient she had normal hemoglobin is 13.9.  B.  I did go over the CBC result from today and reassured the patient her hemoglobin is normal at 13.8 with normal MCV platelets and white blood cells.    2.  History of iron deficiency:  A.  I did go over her iron profile from March 18, 2022.  The patient had adequate iron storage with normal saturation.  B.  I will follow-up on her iron profile from today.  C.  The patient will be treated with IV iron replacement if her ferritin less than 30 or saturation is than 10%.    3.  Vitamin B12 deficiency:  A.  Induced by pernicious anemia.  B.  The patient will continue vitamin B12 replacement without micrograms monthly.    4.  History of arterial thrombosis with positive antiphospholipid antibodies:  A.  The patient is on chronic anticoagulation with Eliquis 5 mg twice daily.  B.  Again we discussed role of warfarin.  I explained the patient that warfarin is the gold standard treatment for antiplatelet antibodies associated thrombosis.  The patient had bad experience with warfarin before with her daughter.  She has been doing extremely well with Eliquis and likely no evidence of clots for the last year and a half.  She would like to stay with Eliquis for now.  If she changes her mind she will call me and let me know and I will set  her up with warfarin clinic.    FOLLOW UP: 6 months with labs    Ubaldo Morris MD  9/19/2022

## 2022-11-25 DIAGNOSIS — D50.9 IRON DEFICIENCY ANEMIA, UNSPECIFIED IRON DEFICIENCY ANEMIA TYPE: ICD-10-CM

## 2022-11-25 DIAGNOSIS — I74.9 ARTERIAL THROMBOSIS: ICD-10-CM

## 2022-11-25 DIAGNOSIS — K90.9 IRON MALABSORPTION: ICD-10-CM

## 2022-11-28 RX ORDER — CYANOCOBALAMIN 1000 UG/ML
INJECTION, SOLUTION INTRAMUSCULAR; SUBCUTANEOUS
Qty: 1 ML | Refills: 0 | Status: SHIPPED | OUTPATIENT
Start: 2022-11-28 | End: 2022-12-30

## 2022-12-29 DIAGNOSIS — I74.9 ARTERIAL THROMBOSIS: ICD-10-CM

## 2022-12-29 DIAGNOSIS — K90.9 IRON MALABSORPTION: ICD-10-CM

## 2022-12-29 DIAGNOSIS — D50.9 IRON DEFICIENCY ANEMIA, UNSPECIFIED IRON DEFICIENCY ANEMIA TYPE: ICD-10-CM

## 2022-12-30 RX ORDER — CYANOCOBALAMIN 1000 UG/ML
INJECTION, SOLUTION INTRAMUSCULAR; SUBCUTANEOUS
Qty: 1 ML | Refills: 0 | Status: SHIPPED | OUTPATIENT
Start: 2022-12-30 | End: 2023-02-22

## 2023-02-21 DIAGNOSIS — I74.9 ARTERIAL THROMBOSIS: ICD-10-CM

## 2023-02-21 DIAGNOSIS — D50.9 IRON DEFICIENCY ANEMIA, UNSPECIFIED IRON DEFICIENCY ANEMIA TYPE: ICD-10-CM

## 2023-02-21 DIAGNOSIS — K90.9 IRON MALABSORPTION: ICD-10-CM

## 2023-02-22 RX ORDER — CYANOCOBALAMIN 1000 UG/ML
INJECTION, SOLUTION INTRAMUSCULAR; SUBCUTANEOUS
Qty: 1 ML | Refills: 0 | Status: SHIPPED | OUTPATIENT
Start: 2023-02-22 | End: 2023-03-20 | Stop reason: SDUPTHER

## 2023-03-20 ENCOUNTER — LAB (OUTPATIENT)
Dept: LAB | Facility: HOSPITAL | Age: 48
End: 2023-03-20
Payer: COMMERCIAL

## 2023-03-20 ENCOUNTER — OFFICE VISIT (OUTPATIENT)
Dept: ONCOLOGY | Facility: CLINIC | Age: 48
End: 2023-03-20
Payer: COMMERCIAL

## 2023-03-20 VITALS
HEART RATE: 86 BPM | TEMPERATURE: 96.4 F | DIASTOLIC BLOOD PRESSURE: 63 MMHG | BODY MASS INDEX: 43.15 KG/M2 | HEIGHT: 65 IN | RESPIRATION RATE: 16 BRPM | WEIGHT: 259 LBS | SYSTOLIC BLOOD PRESSURE: 135 MMHG | OXYGEN SATURATION: 98 %

## 2023-03-20 DIAGNOSIS — K90.9 IRON MALABSORPTION: ICD-10-CM

## 2023-03-20 DIAGNOSIS — D50.0 IRON DEFICIENCY ANEMIA DUE TO CHRONIC BLOOD LOSS: ICD-10-CM

## 2023-03-20 DIAGNOSIS — D50.9 IRON DEFICIENCY ANEMIA, UNSPECIFIED IRON DEFICIENCY ANEMIA TYPE: ICD-10-CM

## 2023-03-20 DIAGNOSIS — I74.9 ARTERIAL THROMBOSIS: ICD-10-CM

## 2023-03-20 LAB
BASOPHILS # BLD AUTO: 0.03 10*3/MM3 (ref 0–0.2)
BASOPHILS NFR BLD AUTO: 0.5 % (ref 0–1.5)
DEPRECATED RDW RBC AUTO: 45.7 FL (ref 37–54)
EOSINOPHIL # BLD AUTO: 0.16 10*3/MM3 (ref 0–0.4)
EOSINOPHIL NFR BLD AUTO: 2.9 % (ref 0.3–6.2)
ERYTHROCYTE [DISTWIDTH] IN BLOOD BY AUTOMATED COUNT: 13.3 % (ref 12.3–15.4)
FERRITIN SERPL-MCNC: 150.8 NG/ML (ref 13–150)
HCT VFR BLD AUTO: 41.9 % (ref 34–46.6)
HGB BLD-MCNC: 13.2 G/DL (ref 12–15.9)
IMM GRANULOCYTES # BLD AUTO: 0 10*3/MM3 (ref 0–0.05)
IMM GRANULOCYTES NFR BLD AUTO: 0 % (ref 0–0.5)
IRON 24H UR-MRATE: 72 MCG/DL (ref 37–145)
IRON SATN MFR SERPL: 15 % (ref 20–50)
LYMPHOCYTES # BLD AUTO: 2.91 10*3/MM3 (ref 0.7–3.1)
LYMPHOCYTES NFR BLD AUTO: 52.9 % (ref 19.6–45.3)
MCH RBC QN AUTO: 28.9 PG (ref 26.6–33)
MCHC RBC AUTO-ENTMCNC: 31.5 G/DL (ref 31.5–35.7)
MCV RBC AUTO: 91.9 FL (ref 79–97)
MONOCYTES # BLD AUTO: 0.41 10*3/MM3 (ref 0.1–0.9)
MONOCYTES NFR BLD AUTO: 7.5 % (ref 5–12)
NEUTROPHILS NFR BLD AUTO: 1.99 10*3/MM3 (ref 1.7–7)
NEUTROPHILS NFR BLD AUTO: 36.2 % (ref 42.7–76)
PLATELET # BLD AUTO: 290 10*3/MM3 (ref 140–450)
PMV BLD AUTO: 10.1 FL (ref 6–12)
RBC # BLD AUTO: 4.56 10*6/MM3 (ref 3.77–5.28)
TIBC SERPL-MCNC: 465 MCG/DL (ref 298–536)
TRANSFERRIN SERPL-MCNC: 312 MG/DL (ref 200–360)
WBC NRBC COR # BLD: 5.5 10*3/MM3 (ref 3.4–10.8)

## 2023-03-20 PROCEDURE — 84466 ASSAY OF TRANSFERRIN: CPT

## 2023-03-20 PROCEDURE — 83540 ASSAY OF IRON: CPT

## 2023-03-20 PROCEDURE — 36415 COLL VENOUS BLD VENIPUNCTURE: CPT

## 2023-03-20 PROCEDURE — 99214 OFFICE O/P EST MOD 30 MIN: CPT | Performed by: NURSE PRACTITIONER

## 2023-03-20 PROCEDURE — 82728 ASSAY OF FERRITIN: CPT

## 2023-03-20 PROCEDURE — 85025 COMPLETE CBC W/AUTO DIFF WBC: CPT

## 2023-03-20 PROCEDURE — 82607 VITAMIN B-12: CPT

## 2023-03-20 RX ORDER — GABAPENTIN 100 MG/1
100 CAPSULE ORAL 3 TIMES DAILY
COMMUNITY
Start: 2022-09-28

## 2023-03-20 RX ORDER — CYANOCOBALAMIN 1000 UG/ML
1000 INJECTION, SOLUTION INTRAMUSCULAR; SUBCUTANEOUS
Qty: 1 ML | Refills: 11 | Status: SHIPPED | OUTPATIENT
Start: 2023-03-20

## 2023-03-20 RX ORDER — FLUOXETINE 20 MG/1
20 TABLET ORAL DAILY
COMMUNITY
Start: 2022-09-28 | End: 2023-03-20 | Stop reason: SDUPTHER

## 2023-03-20 RX ORDER — OMEPRAZOLE 40 MG/1
40 CAPSULE, DELAYED RELEASE ORAL
COMMUNITY
Start: 2022-10-30 | End: 2023-03-20 | Stop reason: SDUPTHER

## 2023-03-20 RX ORDER — CERTOLIZUMAB PEGOL 200 MG/ML
INJECTION, SOLUTION SUBCUTANEOUS
COMMUNITY
Start: 2023-03-15

## 2023-03-20 NOTE — PROGRESS NOTES
DATE OF VISIT: 3/20/2023    REASON FOR VISIT: Followup for right below-knee arterial thrombosis    PROBLEM LIST:   1.  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  Presented with pain and toes discoloration  B.  CT angiogram done May 2021 confirmed right below-knee arterial thrombosis  C. Status post percutaneous thrombectomy done by Dr. Sawyer May 2021  D.  Status post stent placement.  E.  Positive IgM beta-2 glycoprotein 1 and IgM anticardiolipin antibodies  E.  Currently on Eliquis 5 mg twice daily, not interested in warfarin.  2.  Obesity  3.  Psoriatic arthritis  4.  Tobacco abuse  5.  Severe iron deficiency  6.  Severe vitamin B12 deficiency  7.  Pernicious anemia:  8.  Positive antiparietal cell antibody Gayle 15, 2021     HISTORY OF PRESENT ILLNESS: The patient is a very pleasant 47 y.o. female  with past medical history significant for arterial thrombosis diagnosed May 2021. The patient is here today for scheduled follow-up visit.    SUBJECTIVE: The patient is here today by himself. She has been doing well since her last visit. She has been compliant with use of Eliquis and has had no symptoms of bleeding or clotting. She complains of increased fatigue. She has been doing her vitamin B12 shorts monthly and requests a refill. She has recently been switched to Cimzia by her rheumatologist due to recent flare of her psoriatic arthritis. She has only had one does, and is still having a lot of joint swelling and pain as well as nail changes associated with her flare. She has started Wygovi to try and help with weight loss. She may consider switching to warfarin in the future once she loses weight her diet is more regulated.     Past History:  Medical History: has a past medical history of Arthritis, Bronchitis, Headache, Hypertension, Low back pain, and Psoriatic arthritis (HCC).   Surgical History: has a past surgical history that includes Lumbar discectomy (Left, 08/09/2004); Lumbar discectomy  "(Left, 2011); Breast Reduction ();  section (); and Kidney stone surgery ().   Family History: family history includes Arthritis in her mother; Fibromyalgia in her sister; Hyperlipidemia in her father; Hypertension in her father and mother; Lupus in her mother; Rheum arthritis in her mother.   Social History: reports that she has been smoking cigarettes. She has been smoking an average of .5 packs per day. She has never used smokeless tobacco. She reports current alcohol use. She reports that she does not use drugs.    (Not in a hospital admission)     Allergies: Morphine and Cefdinir      Review of Systems   Constitutional: Positive for fatigue.   Endocrine:        Hot flashes   Musculoskeletal: Positive for arthralgias and joint swelling.   Skin: Positive for rash.        Nail changes     PHYSICAL EXAMINATION:   /63   Pulse 86   Temp 96.4 °F (35.8 °C) (Infrared)   Resp 16   Ht 165.1 cm (65\")   Wt 117 kg (259 lb)   SpO2 98%   BMI 43.10 kg/m²    Pain Score    23 1529   PainSc: 0-No pain      ECOG score: 0        ECOG Performance Status: 0 - Asymptomatic  General Appearance:  alert, cooperative, no apparent distress, appears stated age and obese   Lungs:   Clear to auscultation bilaterally; respirations regular, even, and unlabored bilaterally   Heart:  Regular rate and rhythm, no murmurs appreciated   Abdomen:   Soft, non-tender, non-distended and no organomegaly     Lab on 2023   Component Date Value Ref Range Status   • WBC 2023 5.50  3.40 - 10.80 10*3/mm3 Final   • RBC 2023 4.56  3.77 - 5.28 10*6/mm3 Final   • Hemoglobin 2023 13.2  12.0 - 15.9 g/dL Final   • Hematocrit 2023 41.9  34.0 - 46.6 % Final   • MCV 2023 91.9  79.0 - 97.0 fL Final   • MCH 2023 28.9  26.6 - 33.0 pg Final   • MCHC 2023 31.5  31.5 - 35.7 g/dL Final   • RDW 2023 13.3  12.3 - 15.4 % Final   • RDW-SD 2023 45.7  37.0 - 54.0 fl Final   • MPV " 03/20/2023 10.1  6.0 - 12.0 fL Final   • Platelets 03/20/2023 290  140 - 450 10*3/mm3 Final   • Neutrophil % 03/20/2023 36.2 (L)  42.7 - 76.0 % Final   • Lymphocyte % 03/20/2023 52.9 (H)  19.6 - 45.3 % Final   • Monocyte % 03/20/2023 7.5  5.0 - 12.0 % Final   • Eosinophil % 03/20/2023 2.9  0.3 - 6.2 % Final   • Basophil % 03/20/2023 0.5  0.0 - 1.5 % Final   • Immature Grans % 03/20/2023 0.0  0.0 - 0.5 % Final   • Neutrophils, Absolute 03/20/2023 1.99  1.70 - 7.00 10*3/mm3 Final   • Lymphocytes, Absolute 03/20/2023 2.91  0.70 - 3.10 10*3/mm3 Final   • Monocytes, Absolute 03/20/2023 0.41  0.10 - 0.90 10*3/mm3 Final   • Eosinophils, Absolute 03/20/2023 0.16  0.00 - 0.40 10*3/mm3 Final   • Basophils, Absolute 03/20/2023 0.03  0.00 - 0.20 10*3/mm3 Final   • Immature Grans, Absolute 03/20/2023 0.00  0.00 - 0.05 10*3/mm3 Final        No results found.    ASSESSMENT: The patient is a very pleasant 47 y.o. female  with arterial thrombosis    PLAN:  1.  Normocytic anemia:  A.  I did go over the CBC result from March 18, 2022 and reassured the patient that her hemoglobin is normal at 13.2 with normal MCV at 91.9.   B. Her platelet count is normal at 290,000 with normal WBC at 5,500.     2.  History of iron deficiency:  A.  We will follow up on the iron studies from today and notify her of results.   B. We will arrange for IV iron replacement in the future if her ferritin less than 30 or saturation is than 10%.    3.  Vitamin B12 deficiency:  A.  Induced by pernicious anemia.  B.  The patient will continue vitamin B12 replacement 1,000 mcg monthly. She was given a refill on this today.     4.  History of arterial thrombosis with positive antiphospholipid antibodies:  A.  She is continuing Eliquis 5 mg twice a day. She is aware regarding the recommendation for coumadin for treatment of antiphospholipid antibodies,  however she is still not interested in changing her therapy at this time.      5. Psoriatic arthritis:  A. She has  been switched to Cimzia by her rheumatologist.     6. Obesity:  A. She has been started on Wygovi for weight loss. She is being followed by her primary care provider for monitoring.   B. We discussed that obesity is a secondary risk factor for blood clots and weight loss will help reduce risk.     FOLLOW UP: 6 months with labs    Lillie Kruse, APRN  3/20/2023

## 2023-03-21 LAB — VIT B12 BLD-MCNC: 627 PG/ML (ref 211–946)

## 2023-09-26 ENCOUNTER — OFFICE VISIT (OUTPATIENT)
Dept: ONCOLOGY | Facility: CLINIC | Age: 48
End: 2023-09-26
Payer: COMMERCIAL

## 2023-09-26 ENCOUNTER — LAB (OUTPATIENT)
Dept: LAB | Facility: HOSPITAL | Age: 48
End: 2023-09-26
Payer: COMMERCIAL

## 2023-09-26 VITALS
SYSTOLIC BLOOD PRESSURE: 117 MMHG | TEMPERATURE: 96.4 F | OXYGEN SATURATION: 96 % | DIASTOLIC BLOOD PRESSURE: 56 MMHG | BODY MASS INDEX: 43.32 KG/M2 | WEIGHT: 260 LBS | RESPIRATION RATE: 18 BRPM | HEIGHT: 65 IN | HEART RATE: 82 BPM

## 2023-09-26 DIAGNOSIS — D50.0 IRON DEFICIENCY ANEMIA DUE TO CHRONIC BLOOD LOSS: Primary | ICD-10-CM

## 2023-09-26 DIAGNOSIS — I74.9 ARTERIAL THROMBOSIS: ICD-10-CM

## 2023-09-26 DIAGNOSIS — D50.9 IRON DEFICIENCY ANEMIA, UNSPECIFIED IRON DEFICIENCY ANEMIA TYPE: ICD-10-CM

## 2023-09-26 LAB
ALBUMIN SERPL-MCNC: 4.4 G/DL (ref 3.5–5.2)
ALBUMIN/GLOB SERPL: 1.7 G/DL
ALP SERPL-CCNC: 89 U/L (ref 39–117)
ALT SERPL W P-5'-P-CCNC: 30 U/L (ref 1–33)
ANION GAP SERPL CALCULATED.3IONS-SCNC: 10 MMOL/L (ref 5–15)
AST SERPL-CCNC: 33 U/L (ref 1–32)
BASOPHILS # BLD AUTO: 0.03 10*3/MM3 (ref 0–0.2)
BASOPHILS NFR BLD AUTO: 0.6 % (ref 0–1.5)
BILIRUB SERPL-MCNC: 0.4 MG/DL (ref 0–1.2)
BUN SERPL-MCNC: 9 MG/DL (ref 6–20)
BUN/CREAT SERPL: 8.7 (ref 7–25)
CALCIUM SPEC-SCNC: 9.2 MG/DL (ref 8.6–10.5)
CHLORIDE SERPL-SCNC: 103 MMOL/L (ref 98–107)
CO2 SERPL-SCNC: 28 MMOL/L (ref 22–29)
CREAT SERPL-MCNC: 1.03 MG/DL (ref 0.57–1)
DEPRECATED RDW RBC AUTO: 42.5 FL (ref 37–54)
EGFRCR SERPLBLD CKD-EPI 2021: 67.6 ML/MIN/1.73
EOSINOPHIL # BLD AUTO: 0.18 10*3/MM3 (ref 0–0.4)
EOSINOPHIL NFR BLD AUTO: 3.8 % (ref 0.3–6.2)
ERYTHROCYTE [DISTWIDTH] IN BLOOD BY AUTOMATED COUNT: 12.8 % (ref 12.3–15.4)
FERRITIN SERPL-MCNC: 163.9 NG/ML (ref 13–150)
GLOBULIN UR ELPH-MCNC: 2.6 GM/DL
GLUCOSE SERPL-MCNC: 133 MG/DL (ref 65–99)
HCT VFR BLD AUTO: 37 % (ref 34–46.6)
HGB BLD-MCNC: 11.9 G/DL (ref 12–15.9)
IMM GRANULOCYTES # BLD AUTO: 0 10*3/MM3 (ref 0–0.05)
IMM GRANULOCYTES NFR BLD AUTO: 0 % (ref 0–0.5)
IRON 24H UR-MRATE: 75 MCG/DL (ref 37–145)
IRON SATN MFR SERPL: 17 % (ref 20–50)
LYMPHOCYTES # BLD AUTO: 2.35 10*3/MM3 (ref 0.7–3.1)
LYMPHOCYTES NFR BLD AUTO: 49.2 % (ref 19.6–45.3)
MCH RBC QN AUTO: 29 PG (ref 26.6–33)
MCHC RBC AUTO-ENTMCNC: 32.2 G/DL (ref 31.5–35.7)
MCV RBC AUTO: 90.2 FL (ref 79–97)
MONOCYTES # BLD AUTO: 0.35 10*3/MM3 (ref 0.1–0.9)
MONOCYTES NFR BLD AUTO: 7.3 % (ref 5–12)
NEUTROPHILS NFR BLD AUTO: 1.87 10*3/MM3 (ref 1.7–7)
NEUTROPHILS NFR BLD AUTO: 39.1 % (ref 42.7–76)
PLATELET # BLD AUTO: 310 10*3/MM3 (ref 140–450)
PMV BLD AUTO: 9.7 FL (ref 6–12)
POTASSIUM SERPL-SCNC: 4.2 MMOL/L (ref 3.5–5.2)
PROT SERPL-MCNC: 7 G/DL (ref 6–8.5)
RBC # BLD AUTO: 4.1 10*6/MM3 (ref 3.77–5.28)
SODIUM SERPL-SCNC: 141 MMOL/L (ref 136–145)
TIBC SERPL-MCNC: 447 MCG/DL (ref 298–536)
TRANSFERRIN SERPL-MCNC: 300 MG/DL (ref 200–360)
WBC NRBC COR # BLD: 4.78 10*3/MM3 (ref 3.4–10.8)

## 2023-09-26 PROCEDURE — 82728 ASSAY OF FERRITIN: CPT

## 2023-09-26 PROCEDURE — 36415 COLL VENOUS BLD VENIPUNCTURE: CPT

## 2023-09-26 PROCEDURE — 83540 ASSAY OF IRON: CPT

## 2023-09-26 PROCEDURE — 80053 COMPREHEN METABOLIC PANEL: CPT

## 2023-09-26 PROCEDURE — 85025 COMPLETE CBC W/AUTO DIFF WBC: CPT

## 2023-09-26 PROCEDURE — 99214 OFFICE O/P EST MOD 30 MIN: CPT | Performed by: INTERNAL MEDICINE

## 2023-09-26 PROCEDURE — 82607 VITAMIN B-12: CPT

## 2023-09-26 PROCEDURE — 84466 ASSAY OF TRANSFERRIN: CPT

## 2023-09-26 NOTE — PROGRESS NOTES
DATE OF VISIT: 2023    REASON FOR VISIT: Followup for right below-knee arterial thrombosis    PROBLEM LIST:   1.  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  Presented with pain and toes discoloration  B.  CT angiogram done May 2021 confirmed right below-knee arterial thrombosis  C. Status post percutaneous thrombectomy done by Dr. Sawyer May 2021  D.  Status post stent placement.  E.  Positive IgM beta-2 glycoprotein 1 and IgM anticardiolipin antibodies  E.  Currently on Eliquis 5 mg twice daily, not interested in warfarin.  2.  Obesity  3.  Psoriatic arthritis  4.  Tobacco abuse  5.  Severe iron deficiency  6.  Severe vitamin B12 deficiency  7.  Pernicious anemia:  8.  Positive antiparietal cell antibody Gayle 15, 2021     HISTORY OF PRESENT ILLNESS: The patient is a very pleasant 47 y.o. female  with past medical history significant for arterial thrombosis diagnosed May 2021. The patient is here today for scheduled follow-up visit.    SUBJECTIVE: Patient is here today by herself.  She has been compliant with her Eliquis.  She is still interested in warfarin.  She is requesting refills on B12 shots.    Past History:  Medical History: has a past medical history of Arthritis, Bronchitis, Headache, Hypertension, Low back pain, and Psoriatic arthritis.   Surgical History: has a past surgical history that includes Lumbar discectomy (Left, 2004); Lumbar discectomy (Left, 2011); Breast Reduction ();  section (); and Kidney stone surgery ().   Family History: family history includes Arthritis in her mother; Fibromyalgia in her sister; Hyperlipidemia in her father; Hypertension in her father and mother; Lupus in her mother; Rheum arthritis in her mother.   Social History: reports that she has been smoking cigarettes. She has been smoking an average of .5 packs per day. She has never used smokeless tobacco. She reports current alcohol use. She reports that she does not  "use drugs.    (Not in a hospital admission)     Allergies: Morphine and Cefdinir      Review of Systems   Constitutional:  Positive for fatigue.   Endocrine: Positive for heat intolerance.   Musculoskeletal:  Positive for arthralgias.   PHYSICAL EXAMINATION:   /56   Pulse 82   Temp 96.4 °F (35.8 °C) (Infrared)   Resp 18   Ht 165.1 cm (65\")   Wt 118 kg (260 lb)   SpO2 96%   BMI 43.27 kg/m²    Pain Score    09/26/23 1452   PainSc: 0-No pain      ECOG score: 0        ECOG Performance Status: 1 - Symptomatic but completely ambulatory  General Appearance:  alert, cooperative, no apparent distress, appears stated age, and obese by BMI criteria   Lungs:   Clear to auscultation bilaterally; respirations regular, even, and unlabored bilaterally   Heart:  Regular rate and rhythm, no murmurs appreciated   Abdomen:   Soft, non-tender, non-distended, and no organomegaly     Lab on 09/26/2023   Component Date Value Ref Range Status    WBC 09/26/2023 4.78  3.40 - 10.80 10*3/mm3 Final    RBC 09/26/2023 4.10  3.77 - 5.28 10*6/mm3 Final    Hemoglobin 09/26/2023 11.9 (L)  12.0 - 15.9 g/dL Final    Hematocrit 09/26/2023 37.0  34.0 - 46.6 % Final    MCV 09/26/2023 90.2  79.0 - 97.0 fL Final    MCH 09/26/2023 29.0  26.6 - 33.0 pg Final    MCHC 09/26/2023 32.2  31.5 - 35.7 g/dL Final    RDW 09/26/2023 12.8  12.3 - 15.4 % Final    RDW-SD 09/26/2023 42.5  37.0 - 54.0 fl Final    MPV 09/26/2023 9.7  6.0 - 12.0 fL Final    Platelets 09/26/2023 310  140 - 450 10*3/mm3 Final    Neutrophil % 09/26/2023 39.1 (L)  42.7 - 76.0 % Final    Lymphocyte % 09/26/2023 49.2 (H)  19.6 - 45.3 % Final    Monocyte % 09/26/2023 7.3  5.0 - 12.0 % Final    Eosinophil % 09/26/2023 3.8  0.3 - 6.2 % Final    Basophil % 09/26/2023 0.6  0.0 - 1.5 % Final    Immature Grans % 09/26/2023 0.0  0.0 - 0.5 % Final    Neutrophils, Absolute 09/26/2023 1.87  1.70 - 7.00 10*3/mm3 Final    Lymphocytes, Absolute 09/26/2023 2.35  0.70 - 3.10 10*3/mm3 Final    " Monocytes, Absolute 09/26/2023 0.35  0.10 - 0.90 10*3/mm3 Final    Eosinophils, Absolute 09/26/2023 0.18  0.00 - 0.40 10*3/mm3 Final    Basophils, Absolute 09/26/2023 0.03  0.00 - 0.20 10*3/mm3 Final    Immature Grans, Absolute 09/26/2023 0.00  0.00 - 0.05 10*3/mm3 Final        No results found.    ASSESSMENT: The patient is a very pleasant 47 y.o. female  with arterial thrombosis    PLAN:  1.  Normocytic anemia:  A.  I did go over the CBC result from I explained to the patient her white cells were normal 4.78 hemoglobin is normal 12 with normal MCV of 90 and platelet counts normal at 310.    2.  History of iron deficiency:  A.  We will follow up on the iron studies from today and notify her of results.   B. We will arrange for IV iron replacement in the future if her ferritin less than 30 or saturation is than 10%.    3.  Vitamin B12 deficiency:  A.  Induced by pernicious anemia.  B.  The patient will continue vitamin B12 replacement 1,000 mcg monthly. She was given a refill on this today.     4.  History of arterial thrombosis with positive antiphospholipid antibodies:  A.  She is continuing Eliquis 5 mg twice a day. She is aware regarding the recommendation for coumadin for treatment of antiphospholipid antibodies,  however she is still not interested in changing her therapy at this time.      5. Psoriatic arthritis:  A. She has been switched to Cimzia by her rheumatologist.     6. Obesity:  A. She is on Wygovi for weight loss. She is being followed by her primary care provider for monitoring.   B. We discussed that obesity is a secondary risk factor for blood clots and weight loss will help reduce risk.     FOLLOW UP: 6 months with labs    Ubaldo Morris MD  9/26/2023

## 2023-09-27 LAB — VIT B12 BLD-MCNC: 508 PG/ML (ref 211–946)

## 2024-02-08 ENCOUNTER — TELEPHONE (OUTPATIENT)
Dept: ONCOLOGY | Facility: CLINIC | Age: 49
End: 2024-02-08

## 2024-02-08 NOTE — TELEPHONE ENCOUNTER
[facility] received a fax that requires your attention. The document has been indexed to the patient’s chart for your review.      Reason for sending: PHARMACY NEEDING CLARIFICATION ON CYANOCOBALAM       Name of Sender: [facility]    PHONE  [phone]  FAX        [phone]    Date Indexed: 2.8.24    Notes (if needed): INDEXED UNDER MEDICATIONS. 2.8.24   THANK YOU

## 2024-02-09 DIAGNOSIS — D50.9 IRON DEFICIENCY ANEMIA, UNSPECIFIED IRON DEFICIENCY ANEMIA TYPE: ICD-10-CM

## 2024-02-09 DIAGNOSIS — I74.9 ARTERIAL THROMBOSIS: ICD-10-CM

## 2024-02-09 DIAGNOSIS — K90.9 IRON MALABSORPTION: ICD-10-CM

## 2024-02-09 RX ORDER — CYANOCOBALAMIN 1000 UG/ML
1000 INJECTION, SOLUTION INTRAMUSCULAR; SUBCUTANEOUS
Qty: 1 ML | Refills: 11 | Status: SHIPPED | OUTPATIENT
Start: 2024-02-09

## 2024-03-27 ENCOUNTER — OFFICE VISIT (OUTPATIENT)
Dept: ONCOLOGY | Facility: CLINIC | Age: 49
End: 2024-03-27
Payer: COMMERCIAL

## 2024-03-27 ENCOUNTER — TELEPHONE (OUTPATIENT)
Dept: ONCOLOGY | Facility: CLINIC | Age: 49
End: 2024-03-27

## 2024-03-27 ENCOUNTER — LAB (OUTPATIENT)
Dept: LAB | Facility: HOSPITAL | Age: 49
End: 2024-03-27
Payer: COMMERCIAL

## 2024-03-27 VITALS
SYSTOLIC BLOOD PRESSURE: 127 MMHG | DIASTOLIC BLOOD PRESSURE: 80 MMHG | HEART RATE: 87 BPM | HEIGHT: 65 IN | BODY MASS INDEX: 40.15 KG/M2 | TEMPERATURE: 96.2 F | WEIGHT: 241 LBS | RESPIRATION RATE: 18 BRPM | OXYGEN SATURATION: 97 %

## 2024-03-27 DIAGNOSIS — I74.9 ARTERIAL THROMBOSIS: Primary | ICD-10-CM

## 2024-03-27 DIAGNOSIS — D50.0 IRON DEFICIENCY ANEMIA DUE TO CHRONIC BLOOD LOSS: ICD-10-CM

## 2024-03-27 DIAGNOSIS — K90.9 IRON MALABSORPTION: ICD-10-CM

## 2024-03-27 DIAGNOSIS — E53.8 VITAMIN B 12 DEFICIENCY: ICD-10-CM

## 2024-03-27 LAB
ALBUMIN SERPL-MCNC: 4.4 G/DL (ref 3.5–5.2)
ALBUMIN/GLOB SERPL: 1.6 G/DL
ALP SERPL-CCNC: 81 U/L (ref 39–117)
ALT SERPL W P-5'-P-CCNC: 25 U/L (ref 1–33)
ANION GAP SERPL CALCULATED.3IONS-SCNC: 8 MMOL/L (ref 5–15)
AST SERPL-CCNC: 31 U/L (ref 1–32)
BASOPHILS # BLD AUTO: 0.01 10*3/MM3 (ref 0–0.2)
BASOPHILS NFR BLD AUTO: 0.3 % (ref 0–1.5)
BILIRUB SERPL-MCNC: 0.4 MG/DL (ref 0–1.2)
BUN SERPL-MCNC: 11 MG/DL (ref 6–20)
BUN/CREAT SERPL: 12.5 (ref 7–25)
CALCIUM SPEC-SCNC: 9.2 MG/DL (ref 8.6–10.5)
CHLORIDE SERPL-SCNC: 101 MMOL/L (ref 98–107)
CO2 SERPL-SCNC: 29 MMOL/L (ref 22–29)
CREAT SERPL-MCNC: 0.88 MG/DL (ref 0.57–1)
DEPRECATED RDW RBC AUTO: 42.5 FL (ref 37–54)
EGFRCR SERPLBLD CKD-EPI 2021: 81.2 ML/MIN/1.73
EOSINOPHIL # BLD AUTO: 0.07 10*3/MM3 (ref 0–0.4)
EOSINOPHIL NFR BLD AUTO: 2.1 % (ref 0.3–6.2)
ERYTHROCYTE [DISTWIDTH] IN BLOOD BY AUTOMATED COUNT: 13.1 % (ref 12.3–15.4)
FERRITIN SERPL-MCNC: 187.4 NG/ML (ref 13–150)
GLOBULIN UR ELPH-MCNC: 2.8 GM/DL
GLUCOSE SERPL-MCNC: 91 MG/DL (ref 65–99)
HCT VFR BLD AUTO: 37.2 % (ref 34–46.6)
HGB BLD-MCNC: 12 G/DL (ref 12–15.9)
IMM GRANULOCYTES # BLD AUTO: 0 10*3/MM3 (ref 0–0.05)
IMM GRANULOCYTES NFR BLD AUTO: 0 % (ref 0–0.5)
IRON 24H UR-MRATE: 60 MCG/DL (ref 37–145)
IRON SATN MFR SERPL: 12 % (ref 20–50)
LYMPHOCYTES # BLD AUTO: 1.27 10*3/MM3 (ref 0.7–3.1)
LYMPHOCYTES NFR BLD AUTO: 37.2 % (ref 19.6–45.3)
MCH RBC QN AUTO: 27.9 PG (ref 26.6–33)
MCHC RBC AUTO-ENTMCNC: 32.3 G/DL (ref 31.5–35.7)
MCV RBC AUTO: 86.5 FL (ref 79–97)
MONOCYTES # BLD AUTO: 0.26 10*3/MM3 (ref 0.1–0.9)
MONOCYTES NFR BLD AUTO: 7.6 % (ref 5–12)
NEUTROPHILS NFR BLD AUTO: 1.8 10*3/MM3 (ref 1.7–7)
NEUTROPHILS NFR BLD AUTO: 52.8 % (ref 42.7–76)
PLATELET # BLD AUTO: 322 10*3/MM3 (ref 140–450)
PMV BLD AUTO: 9.7 FL (ref 6–12)
POTASSIUM SERPL-SCNC: 4.7 MMOL/L (ref 3.5–5.2)
PROT SERPL-MCNC: 7.2 G/DL (ref 6–8.5)
RBC # BLD AUTO: 4.3 10*6/MM3 (ref 3.77–5.28)
SODIUM SERPL-SCNC: 138 MMOL/L (ref 136–145)
TIBC SERPL-MCNC: 517 MCG/DL (ref 298–536)
TRANSFERRIN SERPL-MCNC: 347 MG/DL (ref 200–360)
VIT B12 BLD-MCNC: 631 PG/ML (ref 211–946)
WBC NRBC COR # BLD AUTO: 3.41 10*3/MM3 (ref 3.4–10.8)

## 2024-03-27 PROCEDURE — 99214 OFFICE O/P EST MOD 30 MIN: CPT | Performed by: NURSE PRACTITIONER

## 2024-03-27 PROCEDURE — 82728 ASSAY OF FERRITIN: CPT

## 2024-03-27 PROCEDURE — 80053 COMPREHEN METABOLIC PANEL: CPT

## 2024-03-27 PROCEDURE — 84466 ASSAY OF TRANSFERRIN: CPT

## 2024-03-27 PROCEDURE — 36415 COLL VENOUS BLD VENIPUNCTURE: CPT

## 2024-03-27 PROCEDURE — 85025 COMPLETE CBC W/AUTO DIFF WBC: CPT

## 2024-03-27 PROCEDURE — 83540 ASSAY OF IRON: CPT

## 2024-03-27 PROCEDURE — 82607 VITAMIN B-12: CPT

## 2024-03-27 RX ORDER — RISANKIZUMAB-RZAA 150 MG/ML
150 INJECTION SUBCUTANEOUS
COMMUNITY
Start: 2024-01-22

## 2024-03-27 NOTE — TELEPHONE ENCOUNTER
Called and spoke with the patient regarding lab results. Ferritin is increased to 187, with iron saturation at 12%. No need for IV iron replacement at this time. I told her we will base her need for iron replacement on her saturation as her ferritin is likely falsely elevated due to inflammation from her psoriasis. She will keep follow up as scheduled and will call us sooner with symptoms of iron deficiency.

## 2024-03-27 NOTE — PROGRESS NOTES
DATE OF VISIT: 3/27/2024    REASON FOR VISIT: Followup for right below-knee arterial thrombosis    PROBLEM LIST:   1.  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  Presented with pain and toes discoloration  B.  CT angiogram done May 2021 confirmed right below-knee arterial thrombosis  C. Status post percutaneous thrombectomy done by Dr. Sawyer May 2021  D.  Status post stent placement.  E.  Positive IgM beta-2 glycoprotein 1 and IgM anticardiolipin antibodies  E.  Currently on Eliquis 5 mg twice daily, not interested in warfarin.  2.  Obesity  3.  Psoriatic arthritis  4.  Tobacco abuse  5.  Severe iron deficiency  6.  Severe vitamin B12 deficiency  7.  Pernicious anemia:  8.  Positive antiparietal cell antibody Gayle 15, 2021     HISTORY OF PRESENT ILLNESS: The patient is a very pleasant 48 y.o. female  with past medical history significant for arterial thrombosis diagnosed May 2021. The patient is here today for scheduled follow-up visit.    SUBJECTIVE: The patient is here today by herself. She has been doing fairly well since her last visit. She did have a flair of her psoriasis at the end of the year and her treatment was switched to Skyrizi. She has done well on this thus far and her rash and nail changes are improving. She quit smoking in July. She is also using Wegovy and has lost about 30 lbs. She has been compliant with use of Eliqiuis. She denies bleeding or bruising. She does complain of some increasing fatigue.     Past History:  Medical History: has a past medical history of Arthritis, Bronchitis, Headache, Hypertension, Low back pain, and Psoriatic arthritis.   Surgical History: has a past surgical history that includes Lumbar discectomy (Left, 2004); Lumbar discectomy (Left, 2011); Breast Reduction ();  section (); and Kidney stone surgery ().   Family History: family history includes Arthritis in her mother; Fibromyalgia in her sister; Hyperlipidemia in  "her father; Hypertension in her father and mother; Lupus in her mother; Rheum arthritis in her mother.   Social History: reports that she has been smoking cigarettes. She has never used smokeless tobacco. She reports current alcohol use. She reports that she does not use drugs.    (Not in a hospital admission)     Allergies: Morphine and Cefdinir      Review of Systems   Constitutional:  Positive for fatigue.   Endocrine:        Sweating   Skin:  Positive for rash.        Improving rash to right leg and face     PHYSICAL EXAMINATION:   /80   Pulse 87   Temp 96.2 °F (35.7 °C)   Resp 18   Ht 165.1 cm (65\")   Wt 109 kg (241 lb)   SpO2 97%   BMI 40.10 kg/m²    Pain Score    03/27/24 0919   PainSc: 0-No pain               ECOG Performance Status: 0 - Asymptomatic  General Appearance:  alert, cooperative, no apparent distress, appears stated age, and obese by BMI criteria   Lungs:   Clear to auscultation bilaterally; respirations regular, even, and unlabored bilaterally   Heart:  Regular rate and rhythm, no murmurs appreciated   Abdomen:   Soft, non-tender, non-distended, and no organomegaly     Lab on 03/27/2024   Component Date Value Ref Range Status    WBC 03/27/2024 3.41  3.40 - 10.80 10*3/mm3 Final    RBC 03/27/2024 4.30  3.77 - 5.28 10*6/mm3 Final    Hemoglobin 03/27/2024 12.0  12.0 - 15.9 g/dL Final    Hematocrit 03/27/2024 37.2  34.0 - 46.6 % Final    MCV 03/27/2024 86.5  79.0 - 97.0 fL Final    MCH 03/27/2024 27.9  26.6 - 33.0 pg Final    MCHC 03/27/2024 32.3  31.5 - 35.7 g/dL Final    RDW 03/27/2024 13.1  12.3 - 15.4 % Final    RDW-SD 03/27/2024 42.5  37.0 - 54.0 fl Final    MPV 03/27/2024 9.7  6.0 - 12.0 fL Final    Platelets 03/27/2024 322  140 - 450 10*3/mm3 Final    Neutrophil % 03/27/2024 52.8  42.7 - 76.0 % Final    Lymphocyte % 03/27/2024 37.2  19.6 - 45.3 % Final    Monocyte % 03/27/2024 7.6  5.0 - 12.0 % Final    Eosinophil % 03/27/2024 2.1  0.3 - 6.2 % Final    Basophil % 03/27/2024 0.3  " 0.0 - 1.5 % Final    Immature Grans % 03/27/2024 0.0  0.0 - 0.5 % Final    Neutrophils, Absolute 03/27/2024 1.80  1.70 - 7.00 10*3/mm3 Final    Lymphocytes, Absolute 03/27/2024 1.27  0.70 - 3.10 10*3/mm3 Final    Monocytes, Absolute 03/27/2024 0.26  0.10 - 0.90 10*3/mm3 Final    Eosinophils, Absolute 03/27/2024 0.07  0.00 - 0.40 10*3/mm3 Final    Basophils, Absolute 03/27/2024 0.01  0.00 - 0.20 10*3/mm3 Final    Immature Grans, Absolute 03/27/2024 0.00  0.00 - 0.05 10*3/mm3 Final        No results found.    ASSESSMENT: The patient is a very pleasant 48 y.o. female  with arterial thrombosis    PLAN:  1.  Normocytic anemia:  A.  I reviewed the lab results from today with the patient. Her hemoglobin is normal at 12.0 with normal WBC and platelet count. Her MCV is 86.5.     2.  History of iron deficiency:  A.  We will follow up on the iron studies from today and notify her of results.   B. We will arrange for IV iron replacement in the future if her ferritin less than 30 or saturation is than 10%.    3.  Vitamin B12 deficiency:  A.  Induced by pernicious anemia.  B.  The patient will continue vitamin B12 replacement 1,000 mcg monthly.     4.  History of arterial thrombosis with positive antiphospholipid antibodies:  A.  She is continuing Eliquis 5 mg twice a day. She is aware regarding the recommendation for coumadin for treatment of antiphospholipid antibodies,  however she is still not interested in changing her therapy at this time.      5. Psoriatic arthritis:  A. She has been switched to Skyrizi by her rheumatologist.     6. Obesity:  A. She is on Wygovi for weight loss. She is being followed by her primary care provider for monitoring. She has lost about 30 lbs since starting.     7. Tobacco abuse:  A. The patient quit smoking back in July 2023.     FOLLOW UP: 6 months with labs    Lillie Kruse, APRN  3/27/2024

## 2024-09-23 ENCOUNTER — LAB (OUTPATIENT)
Facility: HOSPITAL | Age: 49
End: 2024-09-23
Payer: COMMERCIAL

## 2024-09-23 ENCOUNTER — OFFICE VISIT (OUTPATIENT)
Age: 49
End: 2024-09-23
Payer: COMMERCIAL

## 2024-09-23 VITALS
HEIGHT: 65 IN | DIASTOLIC BLOOD PRESSURE: 78 MMHG | OXYGEN SATURATION: 97 % | HEART RATE: 84 BPM | WEIGHT: 253 LBS | BODY MASS INDEX: 42.15 KG/M2 | SYSTOLIC BLOOD PRESSURE: 138 MMHG | RESPIRATION RATE: 18 BRPM | TEMPERATURE: 96.7 F

## 2024-09-23 DIAGNOSIS — K90.9 IRON MALABSORPTION: ICD-10-CM

## 2024-09-23 DIAGNOSIS — D50.0 IRON DEFICIENCY ANEMIA DUE TO CHRONIC BLOOD LOSS: ICD-10-CM

## 2024-09-23 DIAGNOSIS — E53.8 VITAMIN B 12 DEFICIENCY: ICD-10-CM

## 2024-09-23 DIAGNOSIS — D50.0 IRON DEFICIENCY ANEMIA DUE TO CHRONIC BLOOD LOSS: Primary | ICD-10-CM

## 2024-09-23 DIAGNOSIS — I74.9 ARTERIAL THROMBOSIS: ICD-10-CM

## 2024-09-23 LAB
BASOPHILS # BLD AUTO: 0.02 10*3/MM3 (ref 0–0.2)
BASOPHILS NFR BLD AUTO: 0.7 % (ref 0–1.5)
DEPRECATED RDW RBC AUTO: 41.5 FL (ref 37–54)
EOSINOPHIL # BLD AUTO: 0.08 10*3/MM3 (ref 0–0.4)
EOSINOPHIL NFR BLD AUTO: 2.8 % (ref 0.3–6.2)
ERYTHROCYTE [DISTWIDTH] IN BLOOD BY AUTOMATED COUNT: 13.2 % (ref 12.3–15.4)
FERRITIN SERPL-MCNC: 105 NG/ML (ref 13–150)
HCT VFR BLD AUTO: 37.3 % (ref 34–46.6)
HGB BLD-MCNC: 11.7 G/DL (ref 12–15.9)
IMM GRANULOCYTES # BLD AUTO: 0 10*3/MM3 (ref 0–0.05)
IMM GRANULOCYTES NFR BLD AUTO: 0 % (ref 0–0.5)
IRON 24H UR-MRATE: 51 MCG/DL (ref 37–145)
IRON SATN MFR SERPL: 11 % (ref 20–50)
LYMPHOCYTES # BLD AUTO: 1.15 10*3/MM3 (ref 0.7–3.1)
LYMPHOCYTES NFR BLD AUTO: 40.6 % (ref 19.6–45.3)
MCH RBC QN AUTO: 26.6 PG (ref 26.6–33)
MCHC RBC AUTO-ENTMCNC: 31.4 G/DL (ref 31.5–35.7)
MCV RBC AUTO: 84.8 FL (ref 79–97)
MONOCYTES # BLD AUTO: 0.31 10*3/MM3 (ref 0.1–0.9)
MONOCYTES NFR BLD AUTO: 11 % (ref 5–12)
NEUTROPHILS NFR BLD AUTO: 1.27 10*3/MM3 (ref 1.7–7)
NEUTROPHILS NFR BLD AUTO: 44.9 % (ref 42.7–76)
PLATELET # BLD AUTO: 323 10*3/MM3 (ref 140–450)
PMV BLD AUTO: 10.4 FL (ref 6–12)
RBC # BLD AUTO: 4.4 10*6/MM3 (ref 3.77–5.28)
TIBC SERPL-MCNC: 453 MCG/DL (ref 298–536)
TRANSFERRIN SERPL-MCNC: 304 MG/DL (ref 200–360)
WBC NRBC COR # BLD AUTO: 2.83 10*3/MM3 (ref 3.4–10.8)

## 2024-09-23 PROCEDURE — 82607 VITAMIN B-12: CPT

## 2024-09-23 PROCEDURE — 83540 ASSAY OF IRON: CPT

## 2024-09-23 PROCEDURE — 36415 COLL VENOUS BLD VENIPUNCTURE: CPT

## 2024-09-23 PROCEDURE — 84466 ASSAY OF TRANSFERRIN: CPT

## 2024-09-23 PROCEDURE — 85025 COMPLETE CBC W/AUTO DIFF WBC: CPT

## 2024-09-23 PROCEDURE — 82728 ASSAY OF FERRITIN: CPT

## 2024-09-23 PROCEDURE — 99214 OFFICE O/P EST MOD 30 MIN: CPT | Performed by: INTERNAL MEDICINE

## 2024-09-24 LAB — VIT B12 BLD-MCNC: 645 PG/ML (ref 211–946)

## 2025-02-28 DIAGNOSIS — D50.9 IRON DEFICIENCY ANEMIA, UNSPECIFIED IRON DEFICIENCY ANEMIA TYPE: ICD-10-CM

## 2025-02-28 DIAGNOSIS — K90.9 IRON MALABSORPTION: ICD-10-CM

## 2025-02-28 DIAGNOSIS — I74.9 ARTERIAL THROMBOSIS: ICD-10-CM

## 2025-02-28 RX ORDER — CYANOCOBALAMIN 1000 UG/ML
INJECTION, SOLUTION INTRAMUSCULAR; SUBCUTANEOUS
Qty: 1 ML | Refills: 0 | OUTPATIENT
Start: 2025-02-28

## 2025-03-13 DIAGNOSIS — I74.9 ARTERIAL THROMBOSIS: ICD-10-CM

## 2025-03-13 DIAGNOSIS — K90.9 IRON MALABSORPTION: ICD-10-CM

## 2025-03-13 DIAGNOSIS — D50.9 IRON DEFICIENCY ANEMIA, UNSPECIFIED IRON DEFICIENCY ANEMIA TYPE: ICD-10-CM

## 2025-03-13 RX ORDER — CYANOCOBALAMIN 1000 UG/ML
1000 INJECTION, SOLUTION INTRAMUSCULAR; SUBCUTANEOUS
Qty: 1 ML | Refills: 11 | Status: SHIPPED | OUTPATIENT
Start: 2025-03-13

## 2025-03-24 ENCOUNTER — LAB (OUTPATIENT)
Facility: HOSPITAL | Age: 50
End: 2025-03-24
Payer: COMMERCIAL

## 2025-03-24 ENCOUNTER — OFFICE VISIT (OUTPATIENT)
Age: 50
End: 2025-03-24
Payer: COMMERCIAL

## 2025-03-24 VITALS
HEIGHT: 65 IN | HEART RATE: 106 BPM | BODY MASS INDEX: 39.17 KG/M2 | OXYGEN SATURATION: 94 % | SYSTOLIC BLOOD PRESSURE: 118 MMHG | DIASTOLIC BLOOD PRESSURE: 64 MMHG | WEIGHT: 235.1 LBS

## 2025-03-24 DIAGNOSIS — K90.9 IRON MALABSORPTION: ICD-10-CM

## 2025-03-24 DIAGNOSIS — D50.0 IRON DEFICIENCY ANEMIA DUE TO CHRONIC BLOOD LOSS: ICD-10-CM

## 2025-03-24 DIAGNOSIS — D50.0 IRON DEFICIENCY ANEMIA DUE TO CHRONIC BLOOD LOSS: Primary | ICD-10-CM

## 2025-03-24 DIAGNOSIS — E53.8 VITAMIN B 12 DEFICIENCY: ICD-10-CM

## 2025-03-24 LAB
BASOPHILS # BLD AUTO: 0.02 10*3/MM3 (ref 0–0.2)
BASOPHILS NFR BLD AUTO: 0.6 % (ref 0–1.5)
DEPRECATED RDW RBC AUTO: 41.3 FL (ref 37–54)
EOSINOPHIL # BLD AUTO: 0.08 10*3/MM3 (ref 0–0.4)
EOSINOPHIL NFR BLD AUTO: 2.4 % (ref 0.3–6.2)
ERYTHROCYTE [DISTWIDTH] IN BLOOD BY AUTOMATED COUNT: 13.1 % (ref 12.3–15.4)
FERRITIN SERPL-MCNC: 136.4 NG/ML (ref 13–150)
HCT VFR BLD AUTO: 38.4 % (ref 34–46.6)
HGB BLD-MCNC: 12.3 G/DL (ref 12–15.9)
IMM GRANULOCYTES # BLD AUTO: 0 10*3/MM3 (ref 0–0.05)
IMM GRANULOCYTES NFR BLD AUTO: 0 % (ref 0–0.5)
IRON 24H UR-MRATE: 52 MCG/DL (ref 37–145)
IRON SATN MFR SERPL: 12 % (ref 20–50)
LYMPHOCYTES # BLD AUTO: 1.18 10*3/MM3 (ref 0.7–3.1)
LYMPHOCYTES NFR BLD AUTO: 36.1 % (ref 19.6–45.3)
MCH RBC QN AUTO: 27.2 PG (ref 26.6–33)
MCHC RBC AUTO-ENTMCNC: 32 G/DL (ref 31.5–35.7)
MCV RBC AUTO: 85 FL (ref 79–97)
MONOCYTES # BLD AUTO: 0.28 10*3/MM3 (ref 0.1–0.9)
MONOCYTES NFR BLD AUTO: 8.6 % (ref 5–12)
NEUTROPHILS NFR BLD AUTO: 1.71 10*3/MM3 (ref 1.7–7)
NEUTROPHILS NFR BLD AUTO: 52.3 % (ref 42.7–76)
PLATELET # BLD AUTO: 290 10*3/MM3 (ref 140–450)
PMV BLD AUTO: 10.6 FL (ref 6–12)
RBC # BLD AUTO: 4.52 10*6/MM3 (ref 3.77–5.28)
TIBC SERPL-MCNC: 434 MCG/DL (ref 298–536)
TRANSFERRIN SERPL-MCNC: 291 MG/DL (ref 200–360)
WBC NRBC COR # BLD AUTO: 3.27 10*3/MM3 (ref 3.4–10.8)

## 2025-03-24 PROCEDURE — 83540 ASSAY OF IRON: CPT

## 2025-03-24 PROCEDURE — 82607 VITAMIN B-12: CPT

## 2025-03-24 PROCEDURE — 84466 ASSAY OF TRANSFERRIN: CPT

## 2025-03-24 PROCEDURE — 85025 COMPLETE CBC W/AUTO DIFF WBC: CPT

## 2025-03-24 PROCEDURE — 82728 ASSAY OF FERRITIN: CPT

## 2025-03-24 PROCEDURE — 36415 COLL VENOUS BLD VENIPUNCTURE: CPT

## 2025-03-24 PROCEDURE — 99214 OFFICE O/P EST MOD 30 MIN: CPT | Performed by: NURSE PRACTITIONER

## 2025-03-24 RX ORDER — FEZOLINETANT 45 MG/1
TABLET, FILM COATED ORAL
COMMUNITY
Start: 2025-03-15

## 2025-03-24 RX ORDER — NEEDLES, SAFETY 22GX1 1/2"
1 NEEDLE, DISPOSABLE MISCELLANEOUS
Qty: 12 EACH | Refills: 1 | Status: SHIPPED | OUTPATIENT
Start: 2025-03-24

## 2025-03-24 RX ORDER — TIRZEPATIDE 12.5 MG/.5ML
INJECTION, SOLUTION SUBCUTANEOUS
COMMUNITY
Start: 2025-02-28

## 2025-03-24 RX ORDER — CLOBETASOL PROPIONATE 0.5 MG/G
CREAM TOPICAL
COMMUNITY
Start: 2025-02-06

## 2025-03-24 NOTE — PROGRESS NOTES
DATE OF VISIT: 3/24/2025    REASON FOR VISIT: Followup for right below-knee arterial thrombosis    PROBLEM LIST:   1.  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  Presented with pain and toes discoloration  B.  CT angiogram done May 2021 confirmed right below-knee arterial thrombosis  C. Status post percutaneous thrombectomy done by Dr. Sawyer May 2021  D.  Status post stent placement.  E.  Positive IgM beta-2 glycoprotein 1 and IgM anticardiolipin antibodies  E.  Currently on Eliquis 5 mg twice daily, not interested in warfarin.  2.  Obesity  3.  Psoriatic arthritis  4.  Tobacco abuse  5.  Severe iron deficiency  6.  Severe vitamin B12 deficiency  7.  Pernicious anemia:  8.  Positive antiparietal cell antibody Gayle 15, 2021     HISTORY OF PRESENT ILLNESS: The patient is a very pleasant 49 y.o. female  with past medical history significant for arterial thrombosis diagnosed May 2021. The patient is here today for scheduled follow-up visit.    SUBJECTIVE: The patient is here today by herself.  She has been doing fair since her last visit.  She is having issues with controlling her psoriatic arthritis and is working with rheumatology to get a new drug approved.  She complains of fatigue as well as hot flashes and some lightheadedness that time.  She has gone through menopause and is dealing with hormonal changes that cause night sweats and difficulty sleeping.  She has started on Veozah that has been helpful. She denies changes in stool color or evidence of bleeding with bowel movements.     Past History:  Medical History: has a past medical history of Arthritis, Bronchitis, Headache, Hypertension, Low back pain, and Psoriatic arthritis.   Surgical History: has a past surgical history that includes Lumbar discectomy (Left, 2004); Lumbar discectomy (Left, 2011); Breast Reduction ();  section (); and Kidney stone surgery ().   Family History: family history includes  "Arthritis in her mother; Fibromyalgia in her sister; Hyperlipidemia in her father; Hypertension in her father and mother; Lupus in her mother; Rheum arthritis in her mother.   Social History: reports that she has been smoking cigarettes. She has never used smokeless tobacco. She reports current alcohol use. She reports that she does not use drugs.    (Not in a hospital admission)     Allergies: Morphine and Cefdinir      Review of Systems   Constitutional:  Positive for fatigue.   Endocrine: Positive for heat intolerance.   Musculoskeletal:  Positive for arthralgias.   Skin:  Positive for rash.   Psychiatric/Behavioral:  The patient is nervous/anxious.      PHYSICAL EXAMINATION:   /64   Pulse 106   Ht 165.1 cm (65\")   Wt 107 kg (235 lb 1.6 oz)   SpO2 94%   BMI 39.12 kg/m²    Pain Score    03/24/25 0954   PainSc: 2    PainLoc: Generalized                 ECOG Performance Status: 1 - Symptomatic but completely ambulatory  General Appearance:  alert, cooperative, no apparent distress, appears stated age, and obese by BMI criteria   Lungs:   Clear to auscultation bilaterally; respirations regular, even, and unlabored bilaterally   Heart:  Regular rate and rhythm, no murmurs appreciated   Abdomen:   Soft, non-tender, non-distended, and no organomegaly     Lab on 03/24/2025   Component Date Value Ref Range Status    WBC 03/24/2025 3.27 (L)  3.40 - 10.80 10*3/mm3 Final    RBC 03/24/2025 4.52  3.77 - 5.28 10*6/mm3 Final    Hemoglobin 03/24/2025 12.3  12.0 - 15.9 g/dL Final    Hematocrit 03/24/2025 38.4  34.0 - 46.6 % Final    MCV 03/24/2025 85.0  79.0 - 97.0 fL Final    MCH 03/24/2025 27.2  26.6 - 33.0 pg Final    MCHC 03/24/2025 32.0  31.5 - 35.7 g/dL Final    RDW 03/24/2025 13.1  12.3 - 15.4 % Final    RDW-SD 03/24/2025 41.3  37.0 - 54.0 fl Final    MPV 03/24/2025 10.6  6.0 - 12.0 fL Final    Platelets 03/24/2025 290  140 - 450 10*3/mm3 Final    Neutrophil % 03/24/2025 52.3  42.7 - 76.0 % Final    Lymphocyte % " 03/24/2025 36.1  19.6 - 45.3 % Final    Monocyte % 03/24/2025 8.6  5.0 - 12.0 % Final    Eosinophil % 03/24/2025 2.4  0.3 - 6.2 % Final    Basophil % 03/24/2025 0.6  0.0 - 1.5 % Final    Immature Grans % 03/24/2025 0.0  0.0 - 0.5 % Final    Neutrophils, Absolute 03/24/2025 1.71  1.70 - 7.00 10*3/mm3 Final    Lymphocytes, Absolute 03/24/2025 1.18  0.70 - 3.10 10*3/mm3 Final    Monocytes, Absolute 03/24/2025 0.28  0.10 - 0.90 10*3/mm3 Final    Eosinophils, Absolute 03/24/2025 0.08  0.00 - 0.40 10*3/mm3 Final    Basophils, Absolute 03/24/2025 0.02  0.00 - 0.20 10*3/mm3 Final    Immature Grans, Absolute 03/24/2025 0.00  0.00 - 0.05 10*3/mm3 Final        No results found.    ASSESSMENT: The patient is a very pleasant 49 y.o. female  with arterial thrombosis    PLAN:  1.  Normocytic anemia:  A.  I reviewed the lab results from today with the patient. Her WBC are slightly decreased to 3.27, likely secondary to chronic inflammation. Her hemoglobin is 12.3 with MCV of 85. Her platelet count is within normal limits.     2.  History of iron deficiency:  A.  We will follow up on the iron studies from today and notify her of results.   B. We will arrange for IV iron replacement in the future if her ferritin less than 30 or saturation is than 10%.    3.  Vitamin B12 deficiency:  A.  Induced by pernicious anemia.  B.  The patient will continue vitamin B12 replacement 1,000 mcg monthly. She was given a prescription on syringes to use for her home dosing.   C.  I will follow-up on her B12 level from today.    4.  History of arterial thrombosis with positive antiphospholipid antibodies:  A.  She will continue Eliquis 5 mg twice a day.   B. She is aware regarding the recommendation for coumadin for treatment of antiphospholipid antibodies,  however she is still not interested in changing her therapy at this time.      5. Psoriatic arthritis:  A. She is on Skyrizi and Arava by her rheumatologist.     6. Obesity:  A. She is on Zepbound  for weight loss. She is being followed by her primary care provider for monitoring.       FOLLOW UP: 6 months with labs including CBC iron profile and B12 level.    Lillie Kruse, APRN  3/24/2025

## 2025-03-25 ENCOUNTER — TELEPHONE (OUTPATIENT)
Dept: ONCOLOGY | Facility: CLINIC | Age: 50
End: 2025-03-25
Payer: COMMERCIAL

## 2025-03-25 LAB — VIT B12 BLD-MCNC: 710 PG/ML (ref 211–946)
